# Patient Record
Sex: FEMALE | Race: WHITE | NOT HISPANIC OR LATINO | Employment: OTHER | URBAN - METROPOLITAN AREA
[De-identification: names, ages, dates, MRNs, and addresses within clinical notes are randomized per-mention and may not be internally consistent; named-entity substitution may affect disease eponyms.]

---

## 2022-08-07 ENCOUNTER — HOSPITAL ENCOUNTER (INPATIENT)
Facility: HOSPITAL | Age: 77
LOS: 3 days | Discharge: NURSING, REHAB OR LONG-TERM CARE | End: 2022-08-12
Attending: EMERGENCY MEDICINE | Admitting: INTERNAL MEDICINE

## 2022-08-07 ENCOUNTER — APPOINTMENT (OUTPATIENT)
Dept: GENERAL RADIOLOGY | Facility: HOSPITAL | Age: 77
End: 2022-08-07

## 2022-08-07 DIAGNOSIS — S42.402A CLOSED FRACTURE OF LEFT ELBOW, INITIAL ENCOUNTER: Primary | ICD-10-CM

## 2022-08-07 DIAGNOSIS — S82.035A CLOSED NONDISPLACED TRANSVERSE FRACTURE OF LEFT PATELLA, INITIAL ENCOUNTER: ICD-10-CM

## 2022-08-07 PROBLEM — S82.002A CLOSED FRACTURE OF LEFT PATELLA: Status: ACTIVE | Noted: 2022-08-07

## 2022-08-07 PROBLEM — E11.9 TYPE 2 DIABETES MELLITUS: Status: ACTIVE | Noted: 2022-08-07

## 2022-08-07 PROBLEM — S42.412A LEFT SUPRACONDYLAR HUMERUS FRACTURE: Status: ACTIVE | Noted: 2022-08-07

## 2022-08-07 PROBLEM — I10 ESSENTIAL HYPERTENSION: Status: ACTIVE | Noted: 2022-08-07
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
## 2022-08-07 PROBLEM — E03.9 HYPOTHYROIDISM: Status: ACTIVE | Noted: 2022-08-07

## 2022-08-07 LAB
ALBUMIN SERPL-MCNC: 4 G/DL (ref 3.5–5.2)
ALBUMIN/GLOB SERPL: 1.5 G/DL
ALP SERPL-CCNC: 91 U/L (ref 39–117)
ALT SERPL W P-5'-P-CCNC: 15 U/L (ref 1–33)
ANION GAP SERPL CALCULATED.3IONS-SCNC: 9 MMOL/L (ref 5–15)
AST SERPL-CCNC: 17 U/L (ref 1–32)
BASOPHILS # BLD AUTO: 0.04 10*3/MM3 (ref 0–0.2)
BASOPHILS NFR BLD AUTO: 0.4 % (ref 0–1.5)
BILIRUB SERPL-MCNC: 0.3 MG/DL (ref 0–1.2)
BUN SERPL-MCNC: 23 MG/DL (ref 8–23)
BUN/CREAT SERPL: 32.9 (ref 7–25)
CALCIUM SPEC-SCNC: 9 MG/DL (ref 8.6–10.5)
CHLORIDE SERPL-SCNC: 103 MMOL/L (ref 98–107)
CO2 SERPL-SCNC: 23 MMOL/L (ref 22–29)
CREAT SERPL-MCNC: 0.7 MG/DL (ref 0.57–1)
DEPRECATED RDW RBC AUTO: 41.3 FL (ref 37–54)
EGFRCR SERPLBLD CKD-EPI 2021: 90.3 ML/MIN/1.73
EOSINOPHIL # BLD AUTO: 0.22 10*3/MM3 (ref 0–0.4)
EOSINOPHIL NFR BLD AUTO: 2.1 % (ref 0.3–6.2)
ERYTHROCYTE [DISTWIDTH] IN BLOOD BY AUTOMATED COUNT: 13.2 % (ref 12.3–15.4)
FLUAV SUBTYP SPEC NAA+PROBE: NOT DETECTED
FLUBV RNA ISLT QL NAA+PROBE: NOT DETECTED
GLOBULIN UR ELPH-MCNC: 2.7 GM/DL
GLUCOSE BLDC GLUCOMTR-MCNC: 250 MG/DL (ref 70–130)
GLUCOSE SERPL-MCNC: 294 MG/DL (ref 65–99)
HBA1C MFR BLD: 13.3 % (ref 4.8–5.6)
HCT VFR BLD AUTO: 34.9 % (ref 34–46.6)
HGB BLD-MCNC: 12.1 G/DL (ref 12–15.9)
IMM GRANULOCYTES # BLD AUTO: 0.04 10*3/MM3 (ref 0–0.05)
IMM GRANULOCYTES NFR BLD AUTO: 0.4 % (ref 0–0.5)
LYMPHOCYTES # BLD AUTO: 1.78 10*3/MM3 (ref 0.7–3.1)
LYMPHOCYTES NFR BLD AUTO: 16.7 % (ref 19.6–45.3)
MCH RBC QN AUTO: 29.7 PG (ref 26.6–33)
MCHC RBC AUTO-ENTMCNC: 34.7 G/DL (ref 31.5–35.7)
MCV RBC AUTO: 85.5 FL (ref 79–97)
MONOCYTES # BLD AUTO: 0.63 10*3/MM3 (ref 0.1–0.9)
MONOCYTES NFR BLD AUTO: 5.9 % (ref 5–12)
NEUTROPHILS NFR BLD AUTO: 7.95 10*3/MM3 (ref 1.7–7)
NEUTROPHILS NFR BLD AUTO: 74.5 % (ref 42.7–76)
NRBC BLD AUTO-RTO: 0 /100 WBC (ref 0–0.2)
PLATELET # BLD AUTO: 174 10*3/MM3 (ref 140–450)
PMV BLD AUTO: 10.2 FL (ref 6–12)
POTASSIUM SERPL-SCNC: 4.1 MMOL/L (ref 3.5–5.2)
PROT SERPL-MCNC: 6.7 G/DL (ref 6–8.5)
RBC # BLD AUTO: 4.08 10*6/MM3 (ref 3.77–5.28)
SARS-COV-2 RNA PNL SPEC NAA+PROBE: DETECTED
SODIUM SERPL-SCNC: 135 MMOL/L (ref 136–145)
TSH SERPL DL<=0.05 MIU/L-ACNC: 13.2 UIU/ML (ref 0.27–4.2)
WBC NRBC COR # BLD: 10.66 10*3/MM3 (ref 3.4–10.8)

## 2022-08-07 PROCEDURE — 25010000002 HYDROMORPHONE PER 4 MG: Performed by: EMERGENCY MEDICINE

## 2022-08-07 PROCEDURE — 87636 SARSCOV2 & INF A&B AMP PRB: CPT | Performed by: INTERNAL MEDICINE

## 2022-08-07 PROCEDURE — 25010000002 FENTANYL CITRATE (PF) 50 MCG/ML SOLUTION: Performed by: EMERGENCY MEDICINE

## 2022-08-07 PROCEDURE — 63710000001 INSULIN LISPRO (HUMAN) PER 5 UNITS: Performed by: INTERNAL MEDICINE

## 2022-08-07 PROCEDURE — 2W39X1Z IMMOBILIZATION OF LEFT UPPER EXTREMITY USING SPLINT: ICD-10-PCS | Performed by: EMERGENCY MEDICINE

## 2022-08-07 PROCEDURE — 83036 HEMOGLOBIN GLYCOSYLATED A1C: CPT | Performed by: INTERNAL MEDICINE

## 2022-08-07 PROCEDURE — G0378 HOSPITAL OBSERVATION PER HR: HCPCS

## 2022-08-07 PROCEDURE — 82962 GLUCOSE BLOOD TEST: CPT

## 2022-08-07 PROCEDURE — 85025 COMPLETE CBC W/AUTO DIFF WBC: CPT | Performed by: NURSE PRACTITIONER

## 2022-08-07 PROCEDURE — 73560 X-RAY EXAM OF KNEE 1 OR 2: CPT

## 2022-08-07 PROCEDURE — 84443 ASSAY THYROID STIM HORMONE: CPT | Performed by: INTERNAL MEDICINE

## 2022-08-07 PROCEDURE — 80053 COMPREHEN METABOLIC PANEL: CPT | Performed by: NURSE PRACTITIONER

## 2022-08-07 PROCEDURE — 99284 EMERGENCY DEPT VISIT MOD MDM: CPT

## 2022-08-07 PROCEDURE — 99223 1ST HOSP IP/OBS HIGH 75: CPT | Performed by: INTERNAL MEDICINE

## 2022-08-07 PROCEDURE — 36415 COLL VENOUS BLD VENIPUNCTURE: CPT

## 2022-08-07 PROCEDURE — 25010000002 ONDANSETRON PER 1 MG: Performed by: EMERGENCY MEDICINE

## 2022-08-07 PROCEDURE — 73070 X-RAY EXAM OF ELBOW: CPT

## 2022-08-07 RX ORDER — NICOTINE POLACRILEX 4 MG
15 LOZENGE BUCCAL
Status: DISCONTINUED | OUTPATIENT
Start: 2022-08-07 | End: 2022-08-12 | Stop reason: HOSPADM

## 2022-08-07 RX ORDER — FENTANYL CITRATE 50 UG/ML
25 INJECTION, SOLUTION INTRAMUSCULAR; INTRAVENOUS ONCE
Status: COMPLETED | OUTPATIENT
Start: 2022-08-07 | End: 2022-08-07

## 2022-08-07 RX ORDER — BISACODYL 5 MG/1
5 TABLET, DELAYED RELEASE ORAL DAILY PRN
Status: DISCONTINUED | OUTPATIENT
Start: 2022-08-07 | End: 2022-08-12 | Stop reason: HOSPADM

## 2022-08-07 RX ORDER — ONDANSETRON 2 MG/ML
4 INJECTION INTRAMUSCULAR; INTRAVENOUS ONCE
Status: COMPLETED | OUTPATIENT
Start: 2022-08-07 | End: 2022-08-07

## 2022-08-07 RX ORDER — AMOXICILLIN 250 MG
2 CAPSULE ORAL 2 TIMES DAILY
Status: DISCONTINUED | OUTPATIENT
Start: 2022-08-07 | End: 2022-08-12 | Stop reason: HOSPADM

## 2022-08-07 RX ORDER — POLYETHYLENE GLYCOL 3350 17 G/17G
17 POWDER, FOR SOLUTION ORAL DAILY PRN
Status: DISCONTINUED | OUTPATIENT
Start: 2022-08-07 | End: 2022-08-12 | Stop reason: HOSPADM

## 2022-08-07 RX ORDER — ONDANSETRON 2 MG/ML
4 INJECTION INTRAMUSCULAR; INTRAVENOUS EVERY 6 HOURS PRN
Status: DISCONTINUED | OUTPATIENT
Start: 2022-08-07 | End: 2022-08-12 | Stop reason: HOSPADM

## 2022-08-07 RX ORDER — SODIUM CHLORIDE 0.9 % (FLUSH) 0.9 %
10 SYRINGE (ML) INJECTION AS NEEDED
Status: DISCONTINUED | OUTPATIENT
Start: 2022-08-07 | End: 2022-08-12 | Stop reason: HOSPADM

## 2022-08-07 RX ORDER — HYDROCODONE BITARTRATE AND ACETAMINOPHEN 5; 325 MG/1; MG/1
1 TABLET ORAL EVERY 4 HOURS PRN
Status: DISCONTINUED | OUTPATIENT
Start: 2022-08-07 | End: 2022-08-12 | Stop reason: HOSPADM

## 2022-08-07 RX ORDER — BISACODYL 10 MG
10 SUPPOSITORY, RECTAL RECTAL DAILY PRN
Status: DISCONTINUED | OUTPATIENT
Start: 2022-08-07 | End: 2022-08-12 | Stop reason: HOSPADM

## 2022-08-07 RX ORDER — HYDROMORPHONE HYDROCHLORIDE 1 MG/ML
0.5 INJECTION, SOLUTION INTRAMUSCULAR; INTRAVENOUS; SUBCUTANEOUS ONCE
Status: COMPLETED | OUTPATIENT
Start: 2022-08-07 | End: 2022-08-07

## 2022-08-07 RX ORDER — SODIUM CHLORIDE 0.9 % (FLUSH) 0.9 %
10 SYRINGE (ML) INJECTION EVERY 12 HOURS SCHEDULED
Status: DISCONTINUED | OUTPATIENT
Start: 2022-08-07 | End: 2022-08-12 | Stop reason: HOSPADM

## 2022-08-07 RX ORDER — INSULIN LISPRO 100 [IU]/ML
0-7 INJECTION, SOLUTION INTRAVENOUS; SUBCUTANEOUS
Status: DISCONTINUED | OUTPATIENT
Start: 2022-08-07 | End: 2022-08-09

## 2022-08-07 RX ORDER — DEXTROSE MONOHYDRATE 25 G/50ML
25 INJECTION, SOLUTION INTRAVENOUS
Status: DISCONTINUED | OUTPATIENT
Start: 2022-08-07 | End: 2022-08-12 | Stop reason: HOSPADM

## 2022-08-07 RX ADMIN — HYDROCODONE BITARTRATE AND ACETAMINOPHEN 1 TABLET: 5; 325 TABLET ORAL at 13:36

## 2022-08-07 RX ADMIN — INSULIN LISPRO 4 UNITS: 100 INJECTION, SOLUTION INTRAVENOUS; SUBCUTANEOUS at 17:32

## 2022-08-07 RX ADMIN — HYDROCODONE BITARTRATE AND ACETAMINOPHEN 1 TABLET: 5; 325 TABLET ORAL at 17:32

## 2022-08-07 RX ADMIN — HYDROMORPHONE HYDROCHLORIDE 0.5 MG: 1 INJECTION, SOLUTION INTRAMUSCULAR; INTRAVENOUS; SUBCUTANEOUS at 09:00

## 2022-08-07 RX ADMIN — ONDANSETRON 4 MG: 2 INJECTION INTRAMUSCULAR; INTRAVENOUS at 09:00

## 2022-08-07 RX ADMIN — FENTANYL CITRATE 25 MCG: 50 INJECTION, SOLUTION INTRAMUSCULAR; INTRAVENOUS at 07:42

## 2022-08-07 RX ADMIN — SODIUM CHLORIDE 500 ML: 9 INJECTION, SOLUTION INTRAVENOUS at 07:42

## 2022-08-07 RX ADMIN — SODIUM CHLORIDE 500 ML: 9 INJECTION, SOLUTION INTRAVENOUS at 08:15

## 2022-08-07 RX ADMIN — Medication 10 ML: at 20:41

## 2022-08-07 NOTE — CONSULTS
Orthopedic Consult      Patient: Shira Rogers    Date of Admission: 8/7/2022  7:13 AM    YOB: 1945    Medical Record Number: [id number]    Attending Physician: Loan Sanz MD    Consulting Physician: Edilson Allison Jr., MD      Chief Complaints: Fall [W19.XXXA]      History of Present Illness: 77 y.o. female admitted to [facility] with Fall [W19.XXXA]. I was consulted for further evaluation and treatment of left elbow and knee pain. Onset of symptoms was abrupt starting a few hours ago.  Symptoms are associated with left elbow and knee pain.  Symptoms are aggravated by motion and weightbearing to left upper extremity left lower extremity.   Symptoms improve with rest and elevation.  Is a 77-year-old female who presents after mechanical fall onto left side with resultant left elbow and knee pain.  She denies any pain elsewhere.  She denies any motor deficits or paresthesias.     No Known Allergies     Home Medications:  (Not in a hospital admission)        No past medical history on file.   No past surgical history on file.     Social History     Occupational History   • Not on file   Tobacco Use   • Smoking status: Not on file   • Smokeless tobacco: Not on file   Substance and Sexual Activity   • Alcohol use: Not on file   • Drug use: Not on file   • Sexual activity: Not on file      Social History     Social History Narrative   • Not on file      No family history on file.      Review of Systems:   HEENT: Patient denies any headaches, vision changes, change in hearing, or tinnitus, Patient denies any rhinorrhea, epistaxis, sinus pain, mouth or dental problems, sore throat or hoarseness, or dysphagia  Pulmonary: Patient denies any cough, congestion, SOA, or wheezing  Cardiovascular: Patient denies any chest pain, dyspnea, palpitations, weakness, intolerance of exercise, varicosities, swelling of extremities, known murmur  Gastrointestinal:  Patient denies nausea, vomiting, diarrhea,  constipation, loss  of appetite, change in appetite, dysphagia, gas, heartburn, melena, change in bowel habits, use of laxatives or other drugs to alter the function of the gastrointestinal tract.  Genital/Urinary: Patient denies dysuria, change in color of urine, change in frequency of urination, pain with urgency, incontinence, retention, or nocturia.  Musculoskeletal: Patient denies increased warmth; redness; or swelling of joints; limitation of function; deformity; crepitation: pain in a joint or an extremity, the neck, or the back, especially with movement.  Neurological: Patient denies dizziness, tremor, ataxia, difficulty in speaking, change in speech, paresthesia, loss of sensation, seizures, syncope, changes in memory.  Endocrine system: Patient denies tremors, palpitations, intolerance of heat or cold, polyuria, polydipsia, polyphagia, diaphoresis, exophthalmos, or goiter.  Psychological: Patient denies thoughts/plans or harming self or other; depression,  insomnia, night terrors, clovis, memory loss, disorientation.  Skin: Patient denies any bruising, rashes, discoloration, pruritus, wounds, ulcers, decubiti, changes in the hair or nails  Hematopoietic: Patient denies history of spontaneous or excessive bleeding, epistaxis, hematuria, melena, fatigue, enlarged or tender lymph nodes, pallor, history of anemia.    Physical Exam: 77 y.o. female  General Appearance:    Alert, cooperative, in no acute distress                   Vitals:    08/07/22 0756 08/07/22 0819 08/07/22 0820 08/07/22 0830   BP:    164/81   Pulse: 74 71 76 87   Resp:       Temp:       SpO2: 99% 98% 93% 100%   Weight:       Height:            Head:    Normocephalic, without obvious abnormality, atraumatic      Right Upper Extremity:  No obvious deformity, painless ROM shoulder, elbow, wrist, no joint instability, normal distal strength and sensation to light touch, skin intact without cyanosis, clubbing, edema; +2 radial pulse  Left Upper  Extremity: Positive pain and swelling to the left elbow, no joint instability, normal distal strength and sensation to light touch, skin intact without cyanosis, clubbing, edema; +2 radial pulse  Right Lower Extremity:  No obvious deformity, painless ROM hip, knee, ankle, compartments soft, normal distal strength and sensation to light touch, skin intact without cyanosis, clubbing, edema; +2 dorsalis pedis pulse  Left Lower Extremity: Large suprapatellar effusion with pain about the anterior knee.  Extensor mechanism intact. compartments soft, normal distal strength and sensation to light touch, skin intact without cyanosis, clubbing, edema; +2 dorsalis pedis pulse         Diagnostic Tests:    I have reviewed the labs, radiology results and diagnostic studies: AP and lateral left elbow and AP and lateral of left knee reports were reviewed.    AP and lateral of left elbow demonstrates minimally angulated supracondylar distal humerus fracture without any evidence of intra-articular extension.  There is moderate arthrosis of the elbow joint    AP and lateral of left knee on my exam demonstrates nondisplaced fracture of the distal pole of the patella with large suprapatellar effusion and underlying tricompartmental arthrosis.    Results from last 7 days   Lab Units 08/07/22  0827   WBC 10*3/mm3 10.66   HEMOGLOBIN g/dL 12.1   PLATELETS 10*3/mm3 174     Results from last 7 days   Lab Units 08/07/22  0827   SODIUM mmol/L 135*   POTASSIUM mmol/L 4.1   CO2 mmol/L 23.0   CREATININE mg/dL 0.70   GLUCOSE mg/dL 294*           Assessment: Left distal humerus fracture  Left patella fracture  Patient Active Problem List   Diagnosis   • Fall   • Type 2 diabetes mellitus (HCC)   • Hypothyroidism   • Essential hypertension   • Left supracondylar humerus fracture   • Closed fracture of left patella           Plan: Given the lack of displacement of either fracture, we will first give patient a trial of nonoperative management of both  injuries.  Should patient have difficulty with either of these injuries, we may need to consider operative intervention given the fact that she is a polytrauma.  She will be admitted for pain control.  Upon admission, physical therapy and Occupational Therapy can evaluate her and we will assess the need for operative intervention accordingly.    Nonweightbearing left upper extremity.  Please keep splint clean and dry at all times. Patient may use platform walker.    Weightbearing as tolerated left lower extremity with knee immobilizer on at all times.      We will continue to follow.            Edilson Allison Jr., MD  08/07/22  09:29 EDT

## 2022-08-07 NOTE — ED PROVIDER NOTES
EMERGENCY DEPARTMENT ENCOUNTER    Pt Name: Shira Rogers  MRN: [id number]  Pt :   1945  Room Number:  S380/1  Date of encounter:  2022  PCP: Miguel Mazariegos MD  ED Provider: MARTA Lou    Historian: patient      HPI:  Chief Complaint: Fall with left elbow and left knee injury        Context: Shira Rogers is a 77 y.o. female who presents to the ED via EMS who was called to her home by family.  Patient sustained a fall on her back porch on stairs approximately 1 AM.  She managed to get into her home and sit quietly without seeking assistance from sleeping family.  They found her in the morning with acute pain to her left elbow and left knee and inability to weight-bear.  She denies any head, neck, spine injury.  She denies any dizziness or syncope.    Review of system is negative for fever chills or recent illness.  Negative for chest pain or cough or shortness of breath.  GI and  systems are negative.  No profound weakness, dizziness or syncope.  No neurosensory complaint or focal weakness.      PAST MEDICAL HISTORY  No past medical history on file.      PAST SURGICAL HISTORY  No past surgical history on file.      FAMILY HISTORY  No family history on file.      SOCIAL HISTORY  Social History     Socioeconomic History   • Marital status:          ALLERGIES  Patient has no known allergies.        REVIEW OF SYSTEMS  Review of Systems   All systems reviewed and negative except for those discussed in HPI.       PHYSICAL EXAM    I have reviewed the triage vital signs and nursing notes.    ED Triage Vitals [22 0714]   Temp Heart Rate Resp BP SpO2   98.6 °F (37 °C) 79 18 155/90 96 %      Temp src Heart Rate Source Patient Position BP Location FiO2 (%)   -- -- -- -- --       Physical Exam  GENERAL:   Appears in no acute distress.  She is clearly uncomfortable.  She is a very good historian.  Her vital signs are normal  HENT: Nares patent.  Atraumatic.  Normocephalic  Neck: Patient  demonstrates full range of motion without limitation or tenderness to palpation of cervical spine  EYES: No scleral icterus.  CV: Regular rhythm, regular rate.  No tachycardia.  No peripheral edema  RESPIRATORY: Normal effort.  No audible wheezes, rales or rhonchi.  Chest wall is nontender  ABDOMEN: Soft, nontender  MUSCULOSKELETAL: No deformities.  Pelvis is stable.  Left upper extremity: Patient has soft tissue swelling and acute pain at the left elbow with limited range of motion.  Proximal and distal joints are negative.  Neurovascular exam is negative.  Left lower extremity: Patient has a grossly enlarged left knee consistent with a severe traumatic effusion.  Proximal and distal joints are negative.  Neurovascular exam is negative.  Right-sided extremities are negative.  NEURO: Alert, moves all extremities, follows commands.  No neurosensory deficit or focal weakness.  SKIN: Warm, dry, no rash visualized.        LAB RESULTS  Recent Results (from the past 24 hour(s))   Comprehensive Metabolic Panel    Collection Time: 08/07/22  8:27 AM    Specimen: Blood   Result Value Ref Range    Glucose 294 (H) 65 - 99 mg/dL    BUN 23 8 - 23 mg/dL    Creatinine 0.70 0.57 - 1.00 mg/dL    Sodium 135 (L) 136 - 145 mmol/L    Potassium 4.1 3.5 - 5.2 mmol/L    Chloride 103 98 - 107 mmol/L    CO2 23.0 22.0 - 29.0 mmol/L    Calcium 9.0 8.6 - 10.5 mg/dL    Total Protein 6.7 6.0 - 8.5 g/dL    Albumin 4.00 3.50 - 5.20 g/dL    ALT (SGPT) 15 1 - 33 U/L    AST (SGOT) 17 1 - 32 U/L    Alkaline Phosphatase 91 39 - 117 U/L    Total Bilirubin 0.3 0.0 - 1.2 mg/dL    Globulin 2.7 gm/dL    A/G Ratio 1.5 g/dL    BUN/Creatinine Ratio 32.9 (H) 7.0 - 25.0    Anion Gap 9.0 5.0 - 15.0 mmol/L    eGFR 90.3 >60.0 mL/min/1.73   CBC Auto Differential    Collection Time: 08/07/22  8:27 AM    Specimen: Blood   Result Value Ref Range    WBC 10.66 3.40 - 10.80 10*3/mm3    RBC 4.08 3.77 - 5.28 10*6/mm3    Hemoglobin 12.1 12.0 - 15.9 g/dL    Hematocrit 34.9 34.0  - 46.6 %    MCV 85.5 79.0 - 97.0 fL    MCH 29.7 26.6 - 33.0 pg    MCHC 34.7 31.5 - 35.7 g/dL    RDW 13.2 12.3 - 15.4 %    RDW-SD 41.3 37.0 - 54.0 fl    MPV 10.2 6.0 - 12.0 fL    Platelets 174 140 - 450 10*3/mm3    Neutrophil % 74.5 42.7 - 76.0 %    Lymphocyte % 16.7 (L) 19.6 - 45.3 %    Monocyte % 5.9 5.0 - 12.0 %    Eosinophil % 2.1 0.3 - 6.2 %    Basophil % 0.4 0.0 - 1.5 %    Immature Grans % 0.4 0.0 - 0.5 %    Neutrophils, Absolute 7.95 (H) 1.70 - 7.00 10*3/mm3    Lymphocytes, Absolute 1.78 0.70 - 3.10 10*3/mm3    Monocytes, Absolute 0.63 0.10 - 0.90 10*3/mm3    Eosinophils, Absolute 0.22 0.00 - 0.40 10*3/mm3    Basophils, Absolute 0.04 0.00 - 0.20 10*3/mm3    Immature Grans, Absolute 0.04 0.00 - 0.05 10*3/mm3    nRBC 0.0 0.0 - 0.2 /100 WBC   Hemoglobin A1c    Collection Time: 08/07/22  8:27 AM    Specimen: Blood   Result Value Ref Range    Hemoglobin A1C 13.30 (H) 4.80 - 5.60 %   TSH    Collection Time: 08/07/22  8:27 AM    Specimen: Blood   Result Value Ref Range    TSH 13.200 (H) 0.270 - 4.200 uIU/mL   COVID-19 and FLU A/B PCR - Swab, Nasopharynx    Collection Time: 08/07/22 11:46 AM    Specimen: Nasopharynx; Swab   Result Value Ref Range    COVID19 Detected (C) Not Detected - Ref. Range    Influenza A PCR Not Detected Not Detected    Influenza B PCR Not Detected Not Detected   POC Glucose Once    Collection Time: 08/07/22  4:54 PM    Specimen: Blood   Result Value Ref Range    Glucose 250 (H) 70 - 130 mg/dL       If labs were ordered, I independently reviewed the results.        RADIOLOGY  XR ELBOW 2 VIEW LEFT    Result Date: 8/7/2022  DATE OF EXAM: 8/7/2022 7:39 AM  PROCEDURE: XR ELBOW 2 VW LEFT-  INDICATIONS: fall  COMPARISON: No comparisons available.  TECHNIQUE: Two Radiologic views of the left elbow were obtained.  FINDINGS: There is an acute supracondylar fracture of the distal humerus which on AP view appears to span transversely just proximal from the medial to the lateral epicondyles. Moderate  effusion is present.      There is an acute supracondylar fracture of the distal humerus which on AP view appears to span transversely just proximal from the medial to the lateral epicondyles. Moderate effusion is present.  This report was finalized on 8/7/2022 8:06 AM by Isac Avelar.      XR Knee 1 or 2 View Left    Result Date: 8/7/2022  DATE OF EXAM: 8/7/2022 7:39 AM  PROCEDURE: XR KNEE 1 OR 2 VW LEFT-  INDICATIONS: left knee  COMPARISON: No comparisons available.  TECHNIQUE: One to two radiologic views of left knee were obtained.  FINDINGS: There is cortical disruption along the superior pole of the patella concerning for acute minimally displaced fracture. Cortical margins are otherwise intact and alignment appears grossly maintained. 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 There is a moderate to large dense suprapatellar effusion, likely some component of hemarthrosis.      There is cortical disruption along the superior pole of the patella concerning for acute minimally displaced fracture. Cortical margins are otherwise intact and alignment appears grossly maintained. 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 There is a moderate to large dense suprapatellar effusion, likely some component of hemarthrosis.  This report was finalized on 8/7/2022 8:02 AM by Isac Avelar.        PROCEDURES    Splint - Cast - Strapping    Date/Time: 8/7/2022 6:32 PM  Performed by: Amanda St APRN  Authorized by: Thang Cotto MD     Consent:     Consent obtained:  Verbal    Consent given by:  Patient    Risks, benefits, and alternatives were discussed: yes      Risks discussed:  Pain  Universal protocol:     Procedure explained and questions answered to patient or proxy's satisfaction: yes      Relevant documents present and verified: yes      Test results available: yes      Imaging studies available: yes      Patient identity confirmed:  Verbally with  patient  Pre-procedure details:     Distal neurologic exam:  Normal    Distal perfusion: distal pulses strong and brisk capillary refill    Procedure details:     Location:  Elbow    Elbow location:  L elbow    Strapping: no      Cast type:  Short arm    Splint type:  Sugar tong    Supplies:  Cotton padding, fiberglass and elastic bandage    Attestation: Splint applied and adjusted personally by me    Post-procedure details:     Distal neurologic exam:  Normal    Distal perfusion: distal pulses strong and brisk capillary refill      Procedure completion:  Tolerated well, no immediate complications    Post-procedure imaging: not applicable          No orders to display       MEDICATIONS GIVEN IN ER    Medications   sodium chloride 0.9 % flush 10 mL (has no administration in time range)   sodium chloride 0.9 % flush 10 mL ( Intravenous Canceled Entry 8/7/22 1257)   sodium chloride 0.9 % flush 10 mL (has no administration in time range)   HYDROcodone-acetaminophen (NORCO) 5-325 MG per tablet 1 tablet (1 tablet Oral Given 8/7/22 1732)   sennosides-docusate (PERICOLACE) 8.6-50 MG per tablet 2 tablet (has no administration in time range)     And   polyethylene glycol (MIRALAX) packet 17 g (has no administration in time range)     And   bisacodyl (DULCOLAX) EC tablet 5 mg (has no administration in time range)     And   bisacodyl (DULCOLAX) suppository 10 mg (has no administration in time range)   ondansetron (ZOFRAN) injection 4 mg (has no administration in time range)   dextrose (GLUTOSE) oral gel 15 g (has no administration in time range)   dextrose (D50W) (25 g/50 mL) IV injection 25 g (has no administration in time range)   glucagon (human recombinant) (GLUCAGEN DIAGNOSTIC) injection 1 mg (has no administration in time range)   Insulin Lispro (humaLOG) injection 0-7 Units (4 Units Subcutaneous Given 8/7/22 1732)   fentaNYL citrate (PF) (SUBLIMAZE) injection 25 mcg (25 mcg Intravenous Given 8/7/22 7842)   sodium  chloride 0.9 % bolus 500 mL (0 mL Intravenous Stopped 8/7/22 0824)   sodium chloride 0.9 % bolus 500 mL (0 mL Intravenous Stopped 8/7/22 0900)   HYDROmorphone (DILAUDID) injection 0.5 mg (0.5 mg Intravenous Given 8/7/22 0900)   ondansetron (ZOFRAN) injection 4 mg (4 mg Intravenous Given 8/7/22 0900)         ED Course as of 08/07/22 1833   Sun Aug 07, 2022   0944 Dr. Allison has been consulted; Dr. Briones has accepted for inpatient medical service.  Dr. Allison has visited the patient in the ed.  Skin tear to the left forearm is cleansed and dressed; Left knee immobilizer being placed.   [MS]      ED Course User Index  [MS] Amanda St APRN           AS OF 18:33 EDT VITALS:    BP - 131/71  HR - 68  TEMP - 98.6 °F (37 °C)  O2 SATS - 96%                  DIAGNOSIS  Final diagnoses:   Closed fracture of left elbow, initial encounter   Closed nondisplaced transverse fracture of left patella, initial encounter         DISPOSITION    Admitted             Amanda St APRN  08/07/22 1833

## 2022-08-08 LAB
ANION GAP SERPL CALCULATED.3IONS-SCNC: 8 MMOL/L (ref 5–15)
BUN SERPL-MCNC: 14 MG/DL (ref 8–23)
BUN/CREAT SERPL: 18.4 (ref 7–25)
CALCIUM SPEC-SCNC: 8.5 MG/DL (ref 8.6–10.5)
CHLORIDE SERPL-SCNC: 102 MMOL/L (ref 98–107)
CO2 SERPL-SCNC: 24 MMOL/L (ref 22–29)
CREAT SERPL-MCNC: 0.76 MG/DL (ref 0.57–1)
DEPRECATED RDW RBC AUTO: 41.7 FL (ref 37–54)
EGFRCR SERPLBLD CKD-EPI 2021: 81.8 ML/MIN/1.73
ERYTHROCYTE [DISTWIDTH] IN BLOOD BY AUTOMATED COUNT: 13.3 % (ref 12.3–15.4)
GLUCOSE BLDC GLUCOMTR-MCNC: 242 MG/DL (ref 70–130)
GLUCOSE BLDC GLUCOMTR-MCNC: 270 MG/DL (ref 70–130)
GLUCOSE BLDC GLUCOMTR-MCNC: 274 MG/DL (ref 70–130)
GLUCOSE SERPL-MCNC: 286 MG/DL (ref 65–99)
HCT VFR BLD AUTO: 33.7 % (ref 34–46.6)
HGB BLD-MCNC: 11.5 G/DL (ref 12–15.9)
MCH RBC QN AUTO: 29.1 PG (ref 26.6–33)
MCHC RBC AUTO-ENTMCNC: 34.1 G/DL (ref 31.5–35.7)
MCV RBC AUTO: 85.3 FL (ref 79–97)
PLATELET # BLD AUTO: 159 10*3/MM3 (ref 140–450)
PMV BLD AUTO: 10.1 FL (ref 6–12)
POTASSIUM SERPL-SCNC: 4.3 MMOL/L (ref 3.5–5.2)
RBC # BLD AUTO: 3.95 10*6/MM3 (ref 3.77–5.28)
SODIUM SERPL-SCNC: 134 MMOL/L (ref 136–145)
T4 FREE SERPL-MCNC: 0.9 NG/DL (ref 0.93–1.7)
WBC NRBC COR # BLD: 7.78 10*3/MM3 (ref 3.4–10.8)

## 2022-08-08 PROCEDURE — G0378 HOSPITAL OBSERVATION PER HR: HCPCS

## 2022-08-08 PROCEDURE — 63710000001 INSULIN LISPRO (HUMAN) PER 5 UNITS: Performed by: INTERNAL MEDICINE

## 2022-08-08 PROCEDURE — 84439 ASSAY OF FREE THYROXINE: CPT | Performed by: INTERNAL MEDICINE

## 2022-08-08 PROCEDURE — 97161 PT EVAL LOW COMPLEX 20 MIN: CPT

## 2022-08-08 PROCEDURE — 99232 SBSQ HOSP IP/OBS MODERATE 35: CPT | Performed by: INTERNAL MEDICINE

## 2022-08-08 PROCEDURE — 82962 GLUCOSE BLOOD TEST: CPT

## 2022-08-08 PROCEDURE — 97165 OT EVAL LOW COMPLEX 30 MIN: CPT | Performed by: OCCUPATIONAL THERAPIST

## 2022-08-08 PROCEDURE — 80048 BASIC METABOLIC PNL TOTAL CA: CPT | Performed by: INTERNAL MEDICINE

## 2022-08-08 PROCEDURE — 97535 SELF CARE MNGMENT TRAINING: CPT | Performed by: OCCUPATIONAL THERAPIST

## 2022-08-08 PROCEDURE — 63710000001 INSULIN DETEMIR PER 5 UNITS: Performed by: INTERNAL MEDICINE

## 2022-08-08 PROCEDURE — 85027 COMPLETE CBC AUTOMATED: CPT | Performed by: INTERNAL MEDICINE

## 2022-08-08 PROCEDURE — 97530 THERAPEUTIC ACTIVITIES: CPT

## 2022-08-08 RX ORDER — MECLIZINE HYDROCHLORIDE 25 MG/1
25 TABLET ORAL 3 TIMES DAILY PRN
Status: ON HOLD | COMMUNITY
End: 2022-08-12 | Stop reason: SDUPTHER

## 2022-08-08 RX ORDER — AZELASTINE 1 MG/ML
137 SPRAY, METERED NASAL 2 TIMES DAILY
Status: ON HOLD | COMMUNITY
End: 2022-08-12 | Stop reason: SDUPTHER

## 2022-08-08 RX ORDER — MECLIZINE HYDROCHLORIDE 25 MG/1
25 TABLET ORAL 3 TIMES DAILY PRN
Status: DISCONTINUED | OUTPATIENT
Start: 2022-08-08 | End: 2022-08-12 | Stop reason: HOSPADM

## 2022-08-08 RX ORDER — GLIMEPIRIDE 2 MG/1
2 TABLET ORAL DAILY
Status: ON HOLD | COMMUNITY
End: 2022-08-12 | Stop reason: SDUPTHER

## 2022-08-08 RX ORDER — ATORVASTATIN CALCIUM 40 MG/1
40 TABLET, FILM COATED ORAL DAILY
COMMUNITY

## 2022-08-08 RX ORDER — INSULIN GLARGINE 300 U/ML
300 INJECTION, SOLUTION SUBCUTANEOUS NIGHTLY
Status: ON HOLD | COMMUNITY
End: 2022-08-12 | Stop reason: SDUPTHER

## 2022-08-08 RX ORDER — ATORVASTATIN CALCIUM 40 MG/1
40 TABLET, FILM COATED ORAL NIGHTLY
Status: DISCONTINUED | OUTPATIENT
Start: 2022-08-08 | End: 2022-08-12 | Stop reason: HOSPADM

## 2022-08-08 RX ORDER — LEVOTHYROXINE SODIUM 0.1 MG/1
100 TABLET ORAL
Status: DISCONTINUED | OUTPATIENT
Start: 2022-08-09 | End: 2022-08-12 | Stop reason: HOSPADM

## 2022-08-08 RX ORDER — LEVOTHYROXINE SODIUM 88 UG/1
88 TABLET ORAL DAILY
Status: ON HOLD | COMMUNITY
End: 2022-08-12 | Stop reason: SDUPTHER

## 2022-08-08 RX ADMIN — Medication 10 ML: at 08:52

## 2022-08-08 RX ADMIN — INSULIN DETEMIR 10 UNITS: 100 INJECTION, SOLUTION SUBCUTANEOUS at 20:21

## 2022-08-08 RX ADMIN — HYDROCODONE BITARTRATE AND ACETAMINOPHEN 1 TABLET: 5; 325 TABLET ORAL at 04:30

## 2022-08-08 RX ADMIN — Medication 10 ML: at 20:21

## 2022-08-08 RX ADMIN — INSULIN LISPRO 4 UNITS: 100 INJECTION, SOLUTION INTRAVENOUS; SUBCUTANEOUS at 17:35

## 2022-08-08 RX ADMIN — SENNOSIDES AND DOCUSATE SODIUM 2 TABLET: 50; 8.6 TABLET ORAL at 20:20

## 2022-08-08 RX ADMIN — HYDROCODONE BITARTRATE AND ACETAMINOPHEN 1 TABLET: 5; 325 TABLET ORAL at 20:20

## 2022-08-08 RX ADMIN — SENNOSIDES AND DOCUSATE SODIUM 2 TABLET: 50; 8.6 TABLET ORAL at 08:51

## 2022-08-08 RX ADMIN — INSULIN LISPRO 3 UNITS: 100 INJECTION, SOLUTION INTRAVENOUS; SUBCUTANEOUS at 12:26

## 2022-08-08 RX ADMIN — INSULIN LISPRO 4 UNITS: 100 INJECTION, SOLUTION INTRAVENOUS; SUBCUTANEOUS at 08:51

## 2022-08-08 RX ADMIN — ATORVASTATIN CALCIUM 40 MG: 40 TABLET, FILM COATED ORAL at 20:20

## 2022-08-08 NOTE — PLAN OF CARE
Goal Outcome Evaluation:  Plan of Care Reviewed With: patient           Outcome Evaluation: Pt evaluation limited due to low BP and c/o severe dizziness. Pt presents with decreased balance, pain, and  functional strength/activity tolerance deficits limiting her mobility. Pt mod Ax2 for bed mobility, mod Ax2 for STS, and mod Ax2 for SPT from BSC to bed. Pt with complaints of dizziness w/ BP reading at 70/48 while in a seated position. Pt noted subsiding symptoms after being returned to bed in a supine position. Recommend attempting to ambulate w/ use of erinn-walker or cane when ready to progress mobility. IPPT warranted. Recommend dc SNF with potential to dc to IPR with improved tolerance for therapy.

## 2022-08-08 NOTE — THERAPY EVALUATION
Patient Name: Shira Rogers  : 1945    MRN: [id number]                              Today's Date: 2022       Admit Date: 2022    Visit Dx:     ICD-10-CM ICD-9-CM   1. Closed fracture of left elbow, initial encounter  S42.402A 812.40   2. Closed nondisplaced transverse fracture of left patella, initial encounter  S82.035A 822.0     Patient Active Problem List   Diagnosis   • Fall   • Type 2 diabetes mellitus (HCC)   • Hypothyroidism   • Essential hypertension   • Left supracondylar humerus fracture   • Closed fracture of left patella     Past Medical History:   Diagnosis Date   • Arthritis    • Diabetes mellitus (HCC)    • Disease of thyroid gland    • Elevated cholesterol      Past Surgical History:   Procedure Laterality Date   • APPENDECTOMY     • HYSTERECTOMY     • TONSILLECTOMY        General Information     Row Name 22 105          Physical Therapy Time and Intention    Document Type evaluation  -FW     Mode of Treatment physical therapy  -     Row Name 22 105          General Information    Patient Profile Reviewed yes  -FW     Prior Level of Function independent:;all household mobility;community mobility;gait;transfer;bed mobility;ADL's;home management;cooking;cleaning;driving;using stairs  has RW and shower bench but does not use  -FW     Existing Precautions/Restrictions brace on at all times;non-weight bearing;left;weight bearing;other (see comments)  LUE NWB- sling for comfort measures, may use platform walker; KI on LLE at all times- WBAT w/ KI  -FW     Barriers to Rehab none identified  -FW     Row Name 22 105          Living Environment    People in Home spouse  -FW     Row Name 22 105          Home Main Entrance    Number of Stairs, Main Entrance three  -FW     Stair Railings, Main Entrance none  -FW     Row Name 22 1057          Stairs Within Home, Primary    Stairs, Within Home, Primary 17  17 up to bedroom but has bedroom/bathroom on first level  w/o steps  -FW     Number of Stairs, Within Home, Primary other (see comments)  -FW     Stair Railings, Within Home, Primary railings safe and in good condition  -FW     Row Name 08/08/22 1057          Cognition    Orientation Status (Cognition) oriented x 4  -FW     Row Name 08/08/22 1057          Safety Issues, Functional Mobility    Safety Issues Affecting Function (Mobility) problem-solving;safety precaution awareness;sequencing abilities;insight into deficits/self-awareness;safety precautions follow-through/compliance  -FW     Impairments Affecting Function (Mobility) balance;endurance/activity tolerance;pain  -FW           User Key  (r) = Recorded By, (t) = Taken By, (c) = Cosigned By    Initials Name Provider Type    FW Naveen Silva PT Physical Therapist               Mobility     Row Name 08/08/22 1103          Bed Mobility    Bed Mobility scooting/bridging;sit-supine;supine-sit  -FW     Scooting/Bridging [place] (Bed Mobility) 2 person assist;moderate assist (50% patient effort)  -FW     Supine-Sit [place] (Bed Mobility) moderate assist (50% patient effort);2 person assist  -FW     Sit-Supine [place] (Bed Mobility) moderate assist (50% patient effort);2 person assist  -FW     Assistive Device (Bed Mobility) draw sheet;bed rails  -FW     Row Name 08/08/22 1103          Bed-Chair Transfer    Bed-Chair [place] (Transfers) moderate assist (50% patient effort);2 person assist  -FW     Assistive Device (Bed-Chair Transfers) other (see comments)  BENJIE HDZ  -FW     Comment, (Bed-Chair Transfer) able to stand and pivot over to chair slowly, demonstrated diffiulty with LLE management and decreased weight shift to affected side  -FW     Row Name 08/08/22 1103          Sit-Stand Transfer    Sit-Stand [place] (Transfers) moderate assist (50% patient effort);2 person assist  -FW     Row Name 08/08/22 1103          Gait/Stairs (Locomotion)    [place] Level (Gait) unable to assess  -FW      Comment, (Gait/Stairs) pt w/ c/o of severe dizziness w/ drop in BP while seated on BSC , not safe to progress mobility. recommend gait training with erinn-walker or cane  -FW     Row Name 08/08/22 1103          Mobility    Extremity Weight-bearing Status left upper extremity;left lower extremity  -FW     Left Upper Extremity (Weight-bearing Status) non weight-bearing (NWB)  sling for comfort measures  -FW     Left Lower Extremity (Weight-bearing Status) weight-bearing as tolerated (WBAT)  WBAT in KI  -FW           User Key  (r) = Recorded By, (t) = Taken By, (c) = Cosigned By    Initials Name Provider Type    FW Naveen Silva PT Physical Therapist               Obj/Interventions     Row Name 08/08/22 1108          Range of Motion Comprehensive    General Range of Motion other (see comments)  -FW     Comment, General Range of Motion RLE WFL, unable to assess LLE w/ KI  -FW     Row Name 08/08/22 1108          Strength Comprehensive (MMT)    Comment, General Manual Muscle Testing (MMT) Assessment RLE WFL, unable to perform SLR w/o assistance on L  -FW     Row Name 08/08/22 1108          Balance    Balance Assessment sitting static balance;sitting dynamic balance;standing static balance;standing dynamic balance  -FW     Static Sitting Balance standby assist  -FW     Dynamic Sitting Balance contact guard  -FW     Position, Sitting Balance sitting edge of bed  -FW     Static Standing Balance moderate assist  -FW     Dynamic Standing Balance moderate assist;2-person assist  -FW     Position/Device Used, Standing Balance other (see comments)  RUE HHA  -FW     Row Name 08/08/22 1108          Sensory Assessment (Somatosensory)    Sensory Assessment (Somatosensory) sensation intact  -FW           User Key  (r) = Recorded By, (t) = Taken By, (c) = Cosigned By    Initials Name Provider Type    FW Naveen Silva PT Physical Therapist               Goals/Plan     Row Name 08/08/22 1119          Bed Mobility Goal 1  (PT)    Activity/Assistive Device (Bed Mobility Goal 1, PT) sit to supine/supine to sit  -FW     [place] Level/Cues Needed (Bed Mobility Goal 1, PT) minimum assist (75% or more patient effort)  -FW     Time Frame (Bed Mobility Goal 1, PT) long term goal (LTG);10 days  -FW     Row Name 08/08/22 1119          Transfer Goal 1 (PT)    Activity/Assistive Device (Transfer Goal 1, PT) sit-to-stand/stand-to-sit;bed-to-chair/chair-to-bed  -FW     [place] Level/Cues Needed (Transfer Goal 1, PT) minimum assist (75% or more patient effort)  -FW     Time Frame (Transfer Goal 1, PT) long term goal (LTG);10 days  -FW     Row Name 08/08/22 1119          Gait Training Goal 1 (PT)    Activity/Assistive Device (Gait Training Goal 1, PT) gait (walking locomotion);assistive device use;diminish gait deviation;decrease fall risk;walker, erinn;walker, rolling platform  -FW     [place] Level (Gait Training Goal 1, PT) contact guard required  -FW     Distance (Gait Training Goal 1, PT) 25  -FW     Time Frame (Gait Training Goal 1, PT) long term goal (LTG);10 days  -FW     Row Name 08/08/22 1119          Therapy Assessment/Plan (PT)    Planned Therapy Interventions (PT) balance training;bed mobility training;gait training;home exercise program;joint mobilization;orthotic fitting/training;neuromuscular re-education;patient/family education;postural re-education;ROM (range of motion);strengthening;stretching  -FW           User Key  (r) = Recorded By, (t) = Taken By, (c) = Cosigned By    Initials Name Provider Type    FW Naveen Silva, PT Physical Therapist               Clinical Impression     Row Name 08/08/22 1110          Pain    Pretreatment Pain Rating 0/10 - no pain  -FW     Posttreatment Pain Rating 4/10  -FW     Pain Location - Side/Orientation Left  -FW     Pain Location - extremity;elbow;knee  -FW     Pre/Posttreatment Pain Comment Pt noted increased LUE pain while upright and increased LLE pain while WB and w/ bed  mobility  -FW     Pain Intervention(s) Repositioned;Ambulation/increased activity  -FW     Row Name 08/08/22 1110          Plan of Care Review    Plan of Care Reviewed With patient  -FW     Outcome Evaluation Pt evaluation limited due to low BP and c/o severe dizziness. Pt presents with decreased balance, pain, and  functional strength/activity tolerance defiits limiting her mobility. Pt mod Ax2 for bed mobility, mod Ax2 for STS, and mod Ax2 for SPT from BSC to bed. Pt with complaints of dizziness w/ BP reading at 70/48 while in a seated position. Pt noted subsiding symptoms after being returned to bed in a supine position. IPPT warranted. Recommend dc SNF with potential to dc to IPR with improved tolerance for therapy.  -FW     Row Name 08/08/22 1110          Therapy Assessment/Plan (PT)    Patient/Family Therapy Goals Statement (PT) to return home with   -FW     Rehab Potential (PT) good, to achieve stated therapy goals  -FW     Criteria for Skilled Interventions Met (PT) yes;skilled treatment is necessary  -FW     Therapy Frequency (PT) daily  -FW     Row Name 08/08/22 1110          Vital Signs    Intra Systolic BP Rehab 70  -FW     Intra Treatment Diastolic BP 48   -FW     Post Systolic BP Rehab 139  -FW     Post Treatment Diastolic BP 87  -FW     O2 Delivery Pre Treatment room air  -FW     O2 Delivery Intra Treatment room air  -FW     O2 Delivery Post Treatment room air  -FW     Pre Patient Position Supine  -FW     Intra Patient Position Standing  -FW     Post Patient Position Supine  -FW     Row Name 08/08/22 1110          Positioning and Restraints    Pre-Treatment Position in bed  -FW     Post Treatment Position bed  -FW     In Bed notified nsg;supine;exit alarm on;encouraged to call for assist;call light within reach;with OT;SCD pump applied  -FW           User Key  (r) = Recorded By, (t) = Taken By, (c) = Cosigned By    Initials Name Provider Type    FW Naveen Silva, PT Physical Therapist                Outcome Measures     Row Name 08/08/22 1121          How much help from another person do you currently need...    Turning from your back to your side while in flat bed without using bedrails? 2  -FW     Moving from lying on back to sitting on the side of a flat bed without bedrails? 2  -FW     Moving to and from a bed to a chair (including a wheelchair)? 2  -FW     Standing up from a chair using your arms (e.g., wheelchair, bedside chair)? 2  -FW     Climbing 3-5 steps with a railing? 1  -FW     To walk in hospital room? 2  -FW     AM-PAC 6 Clicks Score (PT) 11  -FW     Highest level of mobility 4 --> Transferred to chair/commode  -FW     Row Name 08/08/22 1121          Functional Assessment    Outcome Measure Options AM-PAC 6 Clicks Basic Mobility (PT)  -           User Key  (r) = Recorded By, (t) = Taken By, (c) = Cosigned By    Initials Name Provider Type     Naveen Silva PT Physical Therapist                             Physical Therapy Education                 Title: PT OT SLP Therapies (In Progress)     Topic: Physical Therapy (In Progress)     Point: Mobility training (Done)     Learning Progress Summary           Patient Acceptance, E, VU by  at 8/8/2022 1121                   Point: Home exercise program (Not Started)     Learner Progress:  Not documented in this visit.          Point: Body mechanics (Done)     Learning Progress Summary           Patient Acceptance, E, VU by  at 8/8/2022 1121                   Point: Precautions (Done)     Learning Progress Summary           Patient Acceptance, E, VU by  at 8/8/2022 1121                               User Key     Initials Effective Dates Name Provider Type Discipline     05/05/22 -  Naveen Silva PT Physical Therapist PT              PT Recommendation and Plan  Planned Therapy Interventions (PT): balance training, bed mobility training, gait training, home exercise program, joint mobilization, orthotic fitting/training,  neuromuscular re-education, patient/family education, postural re-education, ROM (range of motion), strengthening, stretching  Plan of Care Reviewed With: patient  Outcome Evaluation: Pt evaluation limited due to low BP and c/o severe dizziness. Pt presents with decreased balance, pain, and  functional strength/activity tolerance defiits limiting her mobility. Pt mod Ax2 for bed mobility, mod Ax2 for STS, and mod Ax2 for SPT from BSC to bed. Pt with complaints of dizziness w/ BP reading at 70/48 while in a seated position. Pt noted subsiding symptoms after being returned to bed in a supine position. IPPT warranted. Recommend dc SNF with potential to dc to IPR with improved tolerance for therapy.     Time Calculation:    PT Charges     Row Name 08/08/22 1125             Time Calculation    Start Time 1000  -FW      PT Received On 08/08/22  -FW      PT Goal Re-Cert Due Date 08/18/22  -FW              Timed Charges    37047 - PT Therapeutic Activity Minutes 15  -FW              Untimed Charges    PT Eval/Re-eval Minutes 46  -FW              Total Minutes    Timed Charges Total Minutes 15  -FW      Untimed Charges Total Minutes 46  -FW       Total Minutes 61  -FW            User Key  (r) = Recorded By, (t) = Taken By, (c) = Cosigned By    Initials Name Provider Type    FW Naveen Silva PT Physical Therapist              Therapy Charges for Today     Code Description Service Date Service Provider Modifiers Qty    20815486737 HC PT THERAPEUTIC ACT EA 15 MIN 8/8/2022 Naveen Silva, PT GP 1    73188134888 HC PT EVAL LOW COMPLEXITY 4 8/8/2022 Naveen Silva, MEI GP 1          PT G-Codes  Outcome Measure Options: AM-PAC 6 Clicks Basic Mobility (PT)  AM-PAC 6 Clicks Score (PT): 11    Naveen Silva PT  8/8/2022

## 2022-08-08 NOTE — PLAN OF CARE
Goal Outcome Evaluation:  Plan of Care Reviewed With: patient        Progress: improving  Outcome Evaluation: Alert, oriented x 4, and pleasant. VSS. Up with assist with PT, limited by low BP and nausea. LUE elevated on pillow. Voiding well per purewick. Will continue to monitor.

## 2022-08-08 NOTE — PROGRESS NOTES
Orthopedic Daily Progress Note      CC: SIGIFREDO overnight    Pain well controlled  General: no fevers, chills  Abdomen: no nausea, vomiting, or diarrhea    No other complaints    Physical Exam:  I have reviewed the vital signs.  Temp:  [98.1 °F (36.7 °C)-98.8 °F (37.1 °C)] 98.1 °F (36.7 °C)  Heart Rate:  [68-87] 81  Resp:  [16-18] 16  BP: (106-164)/(60-92) 106/67    Objective  General Appearance:    Alert, cooperative, no distress  Extremities: No clubbing, cyanosis, or edema to lower extremities  Pulses:  2+ in distal surgical extremity  Skin: Clean/dry/intact      Results Review:    I have reviewed the labs, radiology results and diagnostic studies:    Results from last 7 days   Lab Units 08/07/22  0827   WBC 10*3/mm3 10.66   HEMOGLOBIN g/dL 12.1   PLATELETS 10*3/mm3 174     Results from last 7 days   Lab Units 08/07/22  0827   SODIUM mmol/L 135*   POTASSIUM mmol/L 4.1   CO2 mmol/L 23.0   CREATININE mg/dL 0.70   GLUCOSE mg/dL 294*       I have reviewed the medications.    Assessment/Problem List  Left distal humerus fracture  Left patella fracture    Plan  Continue trial of nonoperative management   NWB LUE, keep splint clean and dry at all times  May use platform walker  WBAT LLE with knee immobilizer on at all times  PT/OT        Discharge Planning: I expect patient to be discharged to [facility] in TBD days.    Karen Diaz PA-C  08/08/22  08:06 EDT

## 2022-08-08 NOTE — THERAPY EVALUATION
Patient Name: Shria Rogers  : 1945    MRN: [id number]                              Today's Date: 2022       Admit Date: 2022    Visit Dx:     ICD-10-CM ICD-9-CM   1. Closed fracture of left elbow, initial encounter  S42.402A 812.40   2. Closed nondisplaced transverse fracture of left patella, initial encounter  S82.035A 822.0     Patient Active Problem List   Diagnosis   • Fall   • Type 2 diabetes mellitus (HCC)   • Hypothyroidism   • Essential hypertension   • Left supracondylar humerus fracture   • Closed fracture of left patella     Past Medical History:   Diagnosis Date   • Arthritis    • Diabetes mellitus (HCC)    • Disease of thyroid gland    • Elevated cholesterol      Past Surgical History:   Procedure Laterality Date   • APPENDECTOMY     • HYSTERECTOMY     • TONSILLECTOMY        General Information     Row Name 22 1141          OT Time and Intention    Document Type evaluation  -AR     Mode of Treatment co-treatment;occupational therapy  -AR     Row Name 22 1141          General Information    Patient Profile Reviewed yes  -AR     Prior Level of Function independent:;all household mobility;community mobility;gait;transfer;ADL's;yard work;driving  -AR     Existing Precautions/Restrictions brace on at all times;non-weight bearing;left;other (see comments)   airborne/contact, NWB LUE- may use platform walk, WBAT LLE- KI AAT, hypotensive  -AR     Barriers to Rehab none identified  -AR     Row Name 22 1141          Living Environment    People in Home spouse  -AR     Row Name 22 1141          Home Main Entrance    Number of Stairs, Main Entrance three  -AR     Stair Railings, Main Entrance none  -AR     Row Name 22 1141          Stairs Within Home, Primary    Stairs, Within Home, Primary 17 steps to access her bedroom but can stay on main level of home  -AR     Number of Stairs, Within Home, Primary other (see comments)  -AR     Row Name 22 1141           Cognition    Orientation Status (Cognition) oriented x 4  -AR     Row Name 08/08/22 1141          Safety Issues, Functional Mobility    Safety Issues Affecting Function (Mobility) insight into deficits/self-awareness;problem-solving;safety precaution awareness;safety precautions follow-through/compliance;sequencing abilities  -AR     Impairments Affecting Function (Mobility) balance;endurance/activity tolerance;pain;range of motion (ROM);strength  -AR           User Key  (r) = Recorded By, (t) = Taken By, (c) = Cosigned By    Initials Name Provider Type    Caprice Mcneill OT Occupational Therapist                 Mobility/ADL's     Row Name 08/08/22 1144          Bed Mobility    Bed Mobility supine-sit;scooting/bridging  -AR     Scooting/Bridging [place] (Bed Mobility) 2 person assist;moderate assist (50% patient effort)  -AR     Supine-Sit [place] (Bed Mobility) moderate assist (50% patient effort);2 person assist  -AR     Sit-Supine [place] (Bed Mobility) moderate assist (50% patient effort);2 person assist  -AR     Bed Mobility, Safety Issues decreased use of arms for pushing/pulling;impaired trunk control for bed mobility  -AR     Assistive Device (Bed Mobility) draw sheet;bed rails  -AR     Comment, (Bed Mobility) Educated pt on importance of maintaining NWB LUE AAT, pt with good ability to maintain.  -AR     Row Name 08/08/22 1144          Transfers    Transfers sit-stand transfer;stand-sit transfer;toilet transfer  -AR     Comment, (Transfers) Pt assisted to BSC, required cues to sequence and for BSC approach. She needed physical assist to advance LLE during stand-to-sit transition. Pt limited with c/o dizziness on BSC- BP 70/48. Pt assisted back to bed and nurse notified of hypotension.  -AR     Bed-Chair [place] (Transfers) moderate assist (50% patient effort);2 person assist  -AR     Assistive Device (Bed-Chair Transfers) other (see comments)  BENJIE HDZ  -AR     Sit-Stand  [place] (Transfers) moderate assist (50% patient effort);2 person assist;verbal cues  -AR     Stand-Sit [place] (Transfers) moderate assist (50% patient effort);2 person assist;verbal cues  -AR     [place] Level (Toilet Transfer) moderate assist (50% patient effort);2 person assist;verbal cues  -AR     Assistive Device (Toilet Transfer) commode, bedside with drop arms  -AR     Row Name 08/08/22 1144          Sit-Stand Transfer    Assistive Device (Sit-Stand Transfers) other (see comments)  -AR     Row Name 08/08/22 1144          Stand-Sit Transfer    Assistive Device (Stand-Sit Transfers) other (see comments)  -AR     Row Name 08/08/22 1144          Toilet Transfer    Type (Toilet Transfer) sit-stand;stand-sit  -AR     Row Name 08/08/22 1144          Activities of Daily Living    BADL Assessment/Intervention bathing;upper body dressing;feeding;toileting  -AR     Row Name 08/08/22 1144          Mobility    Extremity Weight-bearing Status left upper extremity;left lower extremity  -AR     Left Upper Extremity (Weight-bearing Status) non weight-bearing (NWB)  -AR     Left Lower Extremity (Weight-bearing Status) weight-bearing as tolerated (WBAT)  WBAT LLE in KI AAT  -AR     Row Name 08/08/22 1144          Bathing Assessment/Intervention    Comment, (Bathing) Pt not tolerating L shoulder ROM d/t pain. Educated her on pendulum technique for axilla care and issued LH sponge to assist.  -AR     Row Name 08/08/22 1144          Upper Body Dressing Assessment/Training    Comment, (Upper Body Dressing) Educated pt on erinn-dressing technique to improve comfort. Pt c/o L elbow pain with mobility- issued PrestoBox Ultra II sling with pillow removed for upcoming session to wear during mobility as needed.  -AR     Row Name 08/08/22 1144          Self-Feeding Assessment/Training    [place] Level (Feeding) finger foods;supervision  -AR     Position (Self-Feeding) supported sitting  -AR     Comment, (Feeding) Issued  adaptive cup  -AR     Row Name 08/08/22 1144          Toileting Assessment/Training    [place] Level (Toileting) toileting skills;dependent (less than 25% patient effort)  -AR     Assistive Devices (Toileting) commode, bedside with drop arms  -AR     Position (Toileting) supported standing;supported sitting  -AR           User Key  (r) = Recorded By, (t) = Taken By, (c) = Cosigned By    Initials Name Provider Type    Caprice Mcneill OT Occupational Therapist               Obj/Interventions     Row Name 08/08/22 1150          Sensory Assessment (Somatosensory)    Sensory Assessment (Somatosensory) UE sensation intact  -AR     [facility] Name 08/08/22 1150          Vision Assessment/Intervention    Visual Impairment/Limitations WNL;corrective lenses full-time  -AR     [facility] Name 08/08/22 1150          Range of Motion Comprehensive    Comment, General Range of Motion RUE WFL, pt unable to tolerate L shoulder ROM. minimal doigot ROM noted LUE  -AR     [facility] Name 08/08/22 1150          Strength Comprehensive (MMT)    Comment, General Manual Muscle Testing (MMT) Assessment RUE 5/5, defer LUE  -AR     Row Name 08/08/22 1150          Balance    Balance Assessment sitting static balance;sitting dynamic balance;standing static balance;standing dynamic balance  -AR     Static Sitting Balance contact guard  -AR     Dynamic Sitting Balance contact guard  -AR     Position, Sitting Balance sitting edge of bed  -AR     Static Standing Balance moderate assist  -AR     Dynamic Standing Balance moderate assist;2-person assist  -AR           User Key  (r) = Recorded By, (t) = Taken By, (c) = Cosigned By    Initials Name Provider Type    Caprice Mcneill OT Occupational Therapist               Goals/Plan     Row Name 08/08/22 1159          Transfer Goal 1 (OT)    Activity/Assistive Device (Transfer Goal 1, OT) sit-to-stand/stand-to-sit;toilet;commode, bedside with drop arms  AD per PT recommendation  -AR     [place]  Level/Cues Needed (Transfer Goal 1, OT) minimum assist (75% or more patient effort);verbal cues required  -AR     Time Frame (Transfer Goal 1, OT) long term goal (LTG);by discharge  -AR     Progress/Outcome (Transfer Goal 1, OT) goal ongoing  -AR     Row Name 08/08/22 1159          Dressing Goal 1 (OT)    Activity/Device (Dressing Goal 1, OT) lower body dressing;long-handled shoe horn;reacher  -AR     [place]/Cues Needed (Dressing Goal 1, OT) moderate assist (50-74% patient effort);verbal cues required  -AR     Time Frame (Dressing Goal 1, OT) long term goal (LTG);by discharge  -AR     Progress/Outcome (Dressing Goal 1, OT) goal ongoing  -AR     Row Name 08/08/22 1159          ROM Goal 1 (OT)    ROM Goal 1 (OT) Pt will complete L pendulum and digit HEP with CGA to support ADL function  -AR     Time Frame (ROM Goal 1, OT) long term goal (LTG);by discharge  -AR     Progress/Outcome (ROM Goal 1, OT) goal ongoing  -AR     [facility] Name 08/08/22 1159          Therapy Assessment/Plan (OT)    Planned Therapy Interventions (OT) activity tolerance training;adaptive equipment training;BADL retraining;edema control/reduction;functional balance retraining;IADL retraining;occupation/activity based interventions;orthotic fabrication/fitting/training;patient/caregiver education/training;ROM/therapeutic exercise;transfer/mobility retraining;strengthening exercise  -AR           User Key  (r) = Recorded By, (t) = Taken By, (c) = Cosigned By    Initials Name Provider Type    AR Caprice Tucker, BALDEMAR Occupational Therapist               Clinical Impression     Row Name 08/08/22 1151          Pain Assessment    Pretreatment Pain Rating 0/10 - no pain  -AR     Posttreatment Pain Rating 1/10  -AR     Pain Location - Side/Orientation Left  -AR     Pain Location upper  -AR     Pain Location - extremity  -AR     Pain Intervention(s) Repositioned;Ambulation/increased activity;Elevated  -AR     Row Name 08/08/22 1151          Plan of Care  Review    Plan of Care Reviewed With patient  -AR     Outcome Evaluation Pt alert, Ox4 and c/o minimal LUE pain at rest at end of session. She completed bed mobility with mod assist x2, transfer to [facility] with mod assist x2 and dependence post-toilet hygiene and clothing management. Pt limited with hypotension during toileting- BP 70/48 and pt symptomatic. Upon return to supine, /87. OT issued AE and educated pt on use, issued sling for mobility to improve comfort. Recommend SNF, pt in agreement.  -AR     Row Name 08/08/22 1151          Therapy Assessment/Plan (OT)    Rehab Potential (OT) good, to achieve stated therapy goals  -AR     Criteria for Skilled Therapeutic Interventions Met (OT) yes  -AR     Therapy Frequency (OT) daily  -AR     Row Name 08/08/22 1151          Therapy Plan Review/Discharge Plan (OT)    Anticipated Discharge Disposition (OT) skilled nursing facility  -AR     Row Name 08/08/22 1151          Vital Signs    Pre Systolic BP Rehab 106  -AR     Pre Treatment Diastolic BP 67  -AR     Intra Systolic BP Rehab 70  -AR     Intra Treatment Diastolic BP 48   nurse notified  -AR     Post Systolic BP Rehab 139  -AR     Post Treatment Diastolic BP 87  -AR     Post SpO2 (%) 97  -AR     O2 Delivery Post Treatment room air  -AR     Pre Patient Position Supine  -AR     Intra Patient Position Sitting  -AR     Post Patient Position Supine  -AR     Row Name 08/08/22 1151          Positioning and Restraints    Pre-Treatment Position in bed  -AR     Post Treatment Position bed  -AR     In Bed notified nsg;supine;call light within reach;encouraged to call for assist;exit alarm on;SCD pump applied;with brace;LUE elevated  -AR           User Key  (r) = Recorded By, (t) = Taken By, (c) = Cosigned By    Initials Name Provider Type    Caprice Mcneill, OT Occupational Therapist               Outcome Measures     Row Name 08/08/22 1201          How much help from another is currently needed...    Putting on and  taking off regular lower body clothing? 1  -AR     Bathing (including washing, rinsing, and drying) 1  -AR     Toileting (which includes using toilet bed pan or urinal) 1  -AR     Putting on and taking off regular upper body clothing 2  -AR     Taking care of personal grooming (such as brushing teeth) 3  -AR     Eating meals 3  -AR     AM-PAC 6 Clicks Score (OT) 11  -AR     Row Name 08/08/22 1121 08/08/22 0800       How much help from another person do you currently need...    Turning from your back to your side while in flat bed without using bedrails? 2  -FW 2  -AC    Moving from lying on back to sitting on the side of a flat bed without bedrails? 2  -FW 2  -AC    Moving to and from a bed to a chair (including a wheelchair)? 2  -FW 2  -AC    Standing up from a chair using your arms (e.g., wheelchair, bedside chair)? 2  -FW 2  -AC    Climbing 3-5 steps with a railing? 1  -FW 2  -AC    To walk in hospital room? 2  -FW 2  -AC    AM-PAC 6 Clicks Score (PT) 11  -FW 12  -AC    Highest level of mobility 4 --> Transferred to chair/commode  -FW 4 --> Transferred to chair/commode  -AC    Row Name 08/08/22 1201 08/08/22 1121       Functional Assessment    Outcome Measure Options AM-PAC 6 Clicks Daily Activity (OT)  -AR AM-PAC 6 Clicks Basic Mobility (PT)  -FW          User Key  (r) = Recorded By, (t) = Taken By, (c) = Cosigned By    Initials Name Provider Type    Caprice Mcneill OT Occupational Therapist    Kassidy Bay RN Registered Nurse    Naveen Kaur, PT Physical Therapist                Occupational Therapy Education                 Title: PT OT SLP Therapies (In Progress)     Topic: Occupational Therapy (Done)     Point: ADL training (Done)     Description:   Instruct learner(s) on proper safety adaptation and remediation techniques during self care or transfers.   Instruct in proper use of assistive devices.              Learning Progress Summary           Patient [place], BISHOP,TB,D, VU,NR by AR at  8/8/2022 1202                   Point: Home exercise program (Done)     Description:   Instruct learner(s) on appropriate technique for monitoring, assisting and/or progressing therapeutic exercises/activities.              Learning Progress Summary           Patient Eager, E,TB,D, VU,NR by AR at 8/8/2022 1202                   Point: Precautions (Done)     Description:   Instruct learner(s) on prescribed precautions during self-care and functional transfers.              Learning Progress Summary           Patient Eager, E,TB,D, VU,NR by AR at 8/8/2022 1202                   Point: Body mechanics (Done)     Description:   Instruct learner(s) on proper positioning and spine alignment during self-care, functional mobility activities and/or exercises.              Learning Progress Summary           Patient Eager, E,TB,D, VU,NR by AR at 8/8/2022 1202                               User Key     Initials Effective Dates Name Provider Type Discipline    AR 06/16/21 -  Caprice Tucker, OT Occupational Therapist OT              OT Recommendation and Plan  Planned Therapy Interventions (OT): activity tolerance training, adaptive equipment training, BADL retraining, edema control/reduction, functional balance retraining, IADL retraining, occupation/activity based interventions, orthotic fabrication/fitting/training, patient/caregiver education/training, ROM/therapeutic exercise, transfer/mobility retraining, strengthening exercise  Therapy Frequency (OT): daily  Plan of Care Review  Plan of Care Reviewed With: patient  Outcome Evaluation: Pt alert, Ox4 and c/o minimal LUE pain at rest at end of session. She completed bed mobility with mod assist x2, transfer to [facility] with mod assist x2 and dependence post-toilet hygiene and clothing management. Pt limited with hypotension during toileting- BP 70/48 and pt symptomatic. Upon return to supine, /87. OT issued AE and educated pt on use, issued sling for mobility to improve  comfort. Recommend SNF, pt in agreement.     Time Calculation:    Time Calculation- OT     Row Name 08/08/22 1202             Time Calculation- OT    OT Start Time 1040  -AR      OT Received On 08/08/22  -AR      OT Goal Re-Cert Due Date 08/18/22  -AR              Timed Charges    99780 - OT Self Care/Mgmt Minutes 25  -AR              Untimed Charges    OT Eval/Re-eval Minutes 59  -AR              Total Minutes    Timed Charges Total Minutes 25  -AR      Untimed Charges Total Minutes 59  -AR       Total Minutes 84  -AR            User Key  (r) = Recorded By, (t) = Taken By, (c) = Cosigned By    Initials Name Provider Type    AR Caprice Tucker, OT Occupational Therapist              Therapy Charges for Today     Code Description Service Date Service Provider Modifiers Qty    29842162420 HC OT SELF CARE/MGMT/TRAIN EA 15 MIN 8/8/2022 Caprice Tucker, OT GO 2    92802900409 HC OT EVAL LOW COMPLEXITY 4 8/8/2022 Caprice Tucker, OT GO 1    36859367690 HC OT THER SUPP EA 15 MIN 8/8/2022 Caprice Tucker OT GO 2               Caprice Tucker OT  8/8/2022

## 2022-08-08 NOTE — CASE MANAGEMENT/SOCIAL WORK
Discharge Planning Assessment  [facility]     Patient Name: Shira Rogers  MRN: [id number]  Today's Date: 8/8/2022    Admit Date: 8/7/2022     Discharge Needs Assessment     Row Name 08/08/22 1606       Living Environment    People in Home spouse    Name(s) of People in Home Ronen Cohen ( spouse) [phone]    Current Living Arrangements home    Primary Care Provided by self    Provides Primary Care For no one    Family Caregiver if Needed spouse    Quality of Family Relationships unable to assess    Able to Return to Prior Arrangements no    Living Arrangement Comments will need rehab unless mobility improves       Transition Planning    Patient/Family Anticipates Transition to inpatient rehabilitation facility    Patient/Family Anticipated Services at Transition rehabilitation services    Transportation Anticipated family or friend will provide       Discharge Needs Assessment    Readmission Within the Last 30 Days no previous admission in last 30 days    Equipment Currently Used at Home none    Concerns to be Addressed adjustment to diagnosis/illness;discharge planning    Anticipated Changes Related to Illness inability to care for self    Equipment Needed After Discharge walker, rolling    Outpatient/Agency/Support Group Needs skilled nursing facility    Discharge Facility/Level of Care Needs nursing facility, skilled    Current Discharge Risk physical impairment               Discharge Plan     Row Name 08/08/22 1604       Plan    Plan Inpt rehab    Plan Comments chart reviewed. I spoke with Mrs Rogers by phone to initiate d/c planning. She lives with her spouse. She states she was independent with all ADLs,driving,etc prior to admit. She uses no assistive equipment, she has a PCP. We discussed d/c options. If she needs inpt rehab,she will not be able to go until she is out of Covid isolation ( 8/17). If she improves enough with her mobility she could return home before that. We discussed inpt rehab  options. Case mgt will follow progress              Continued Care and Services - Admitted Since 8/7/2022    Coordination has not been started for this encounter.          Demographic Summary     Row Name 08/08/22 1559       General Information    Admission Type observation    Arrived From home    Referral Source physician    Reason for Consult discharge planning    Preferred Language English    General Information Comments PCP- Miguel Mazariegos       Contact Information    Permission Granted to Share Info With                Functional Status     Row Name 08/08/22 1603       Functional Status    Usual Activity Tolerance good    Current Activity Tolerance --  see therapy evals       Functional Status, IADL    Medications independent    Meal Preparation independent    Housekeeping independent    Laundry independent    Shopping independent       Mental Status    General Appearance WDL WDL       Mental Status Summary    Recent Changes in Mental Status/Cognitive Functioning no changes       Employment/    Employment Status retired    Employment/ Comments insurance- Medicare and [employer]               Psychosocial    No documentation.                Abuse/Neglect    No documentation.                Legal    No documentation.                Substance Abuse    No documentation.                Patient Forms    No documentation.                   Sonja C Kellerman, RN

## 2022-08-08 NOTE — PROGRESS NOTES
[facility] Medicine Services  PROGRESS NOTE    Patient Name: Shira Rogers  : 1945  MRN: [id number]    Date of Admission: 2022  Primary Care Physician: Miguel Mazariegos MD    Subjective   Subjective     CC:  F/u fall with fractures    HPI:  Patient resting in bed. No complaints. Asks about COVID retesting. Hasn't been up with PT    ROS:  Gen- No fevers, chills  CV- No chest pain, palpitations  Resp- No cough, dyspnea  GI- No N/V/D, abd pain    Objective   Objective     Vital Signs:   Temp:  [98.1 °F (36.7 °C)-98.8 °F (37.1 °C)] 98.2 °F (36.8 °C)  Heart Rate:  [68-87] 82  Resp:  [16-18] 16  BP: (106-131)/(60-74) 121/74     Physical Exam:  With patient's consent, physical exam was conducted via visual telemedicine encounter with bedside portion accommodated by nursing staff due to patient's current isolation requirements in the interest of PPE conservation.    Constitutional: No acute distress, awake, alert, nontoxic, normal body habitus  HENT: NCAT, MMM  Respiratory: breathing appears nonlabored on room air  Cardiovascular: tele with NSR  Psychiatric: Appropriate affect  Neurologic: Oriented x 3, speech is clear, appears to move all extremities  Skin: No visible rashes seen on exposed skin        Results Reviewed:  LAB RESULTS:      Lab 22  0807 22   WBC 7.78 10.66   HEMOGLOBIN 11.5* 12.1   HEMATOCRIT 33.7* 34.9   PLATELETS 159 174   NEUTROS ABS  --  7.95*   IMMATURE GRANS (ABS)  --  0.04   LYMPHS ABS  --  1.78   MONOS ABS  --  0.63   EOS ABS  --  0.22   MCV 85.3 85.5         Lab 22  0807 22  08   SODIUM 134* 135*   POTASSIUM 4.3 4.1   CHLORIDE 102 103   CO2 24.0 23.0   ANION GAP 8.0 9.0   BUN 14 23   CREATININE 0.76 0.70   EGFR 81.8 90.3   GLUCOSE 286* 294*   CALCIUM 8.5* 9.0   HEMOGLOBIN A1C  --  13.30*   TSH  --  13.200*         Lab 22  0827   TOTAL PROTEIN 6.7   ALBUMIN 4.00   GLOBULIN 2.7   ALT (SGPT) 15   AST (SGOT) 17   BILIRUBIN  0.3   ALK PHOS 91                     Brief Urine Lab Results     None          Microbiology Results Abnormal     None          XR ELBOW 2 VIEW LEFT    Result Date: 8/7/2022  DATE OF EXAM: 8/7/2022 7:39 AM  PROCEDURE: XR ELBOW 2 VW LEFT-  INDICATIONS: fall  COMPARISON: No comparisons available.  TECHNIQUE: Two Radiologic views of the left elbow were obtained.  FINDINGS: There is an acute supracondylar fracture of the distal humerus which on AP view appears to span transversely just proximal from the medial to the lateral epicondyles. Moderate effusion is present.      Impression: There is an acute supracondylar fracture of the distal humerus which on AP view appears to span transversely just proximal from the medial to the lateral epicondyles. Moderate effusion is present.  This report was finalized on 8/7/2022 8:06 AM by Isac Avelar.      XR Knee 1 or 2 View Left    Result Date: 8/7/2022  DATE OF EXAM: 8/7/2022 7:39 AM  PROCEDURE: XR KNEE 1 OR 2 VW LEFT-  INDICATIONS: left knee  COMPARISON: No comparisons available.  TECHNIQUE: One to two radiologic views of left knee were obtained.  FINDINGS: There is cortical disruption along the superior pole of the patella concerning for acute minimally displaced fracture. Cortical margins are otherwise intact and alignment appears grossly maintained. 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 There is a moderate to large dense suprapatellar effusion, likely some component of hemarthrosis.      Impression: There is cortical disruption along the superior pole of the patella concerning for acute minimally displaced fracture. Cortical margins are otherwise intact and alignment appears grossly maintained. 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 There is a moderate to large dense suprapatellar effusion, likely some component of hemarthrosis.  This report was finalized on 8/7/2022 8:02 AM by Iasc Avelar.            I have reviewed  "the medications:  Scheduled Meds:insulin lispro, 0-7 Units, Subcutaneous, TID AC  senna-docusate sodium, 2 tablet, Oral, BID  sodium chloride, 10 mL, Intravenous, Q12H      Continuous Infusions:   PRN Meds:.•  senna-docusate sodium **AND** polyethylene glycol **AND** bisacodyl **AND** bisacodyl  •  dextrose  •  dextrose  •  glucagon (human recombinant)  •  HYDROcodone-acetaminophen  •  ondansetron  •  [COMPLETED] Insert peripheral IV **AND** sodium chloride  •  sodium chloride    Assessment & Plan   Assessment & Plan     Active Hospital Problems    Diagnosis  POA   • **Fall [W19.XXXA]  Yes   • Type 2 diabetes mellitus (HCC) [E11.9]  Yes   • Hypothyroidism [E03.9]  Yes   • Essential hypertension [I10]  Yes   • Left supracondylar humerus fracture [S42.412A]  Yes   • Closed fracture of left patella [S82.002A]  Yes      Resolved Hospital Problems   No resolved problems to display.        Brief Hospital Course to date:  Shira Rogers is a 77 y.o. female with history of hypertension, hyperlipidemia, type 2 diabetes, hypothyroidism, chronic dizziness who presents to the ED status post fall, found to have  Left patella and left humerus fracture     Left patella fracture, left distal humerus fracture s/p fall  -Mechanism of fall unknown, likely mechanical, patient with history of dizziness  -Left elbow and knee x-ray as above  -Ortho has been consulted, recommend nonoperative management for now,  nonweightbearing LUE, WBAT LLE with knee immobilizer  -Continue pain control, PT/OT evaluations     Type 2 diabetes  -patient notes recent \"lecture by my PCP\" for uncontrolled diabetes  - A1c is 13.3, on Toujeo at home     Hyperlipidemia  - continue statin     Hypothyroidism  -TSH is elevated, increase synthroid dose. Unclear compliance. Recheck in 6-8 weeks     Hypertension  -BP currently stable, not on any meds    COVID:  - incidentally positive on screening, vaccinated and boosted. Asymptomatic, no current treatment indication. " Isolation 10 days.    Expected Discharge Location and Transportation: Home vs. rehab  Expected Discharge Date: 1-2 days    DVT prophylaxis:  Mechanical DVT prophylaxis orders are present.     AM-PAC 6 Clicks Score (PT): 11 (08/08/22 1121)    CODE STATUS:   Code Status and Medical Interventions:   Ordered at: 08/07/22 1113     Level Of Support Discussed With:    Patient     Code Status (Patient has no pulse and is not breathing):    CPR (Attempt to Resuscitate)     Medical Interventions (Patient has pulse or is breathing):    Full Support       Yesi Morejon DO  08/08/22

## 2022-08-08 NOTE — PLAN OF CARE
Goal Outcome Evaluation:  Plan of Care Reviewed With: patient           Outcome Evaluation: Pt alert, Ox4 and c/o minimal LUE pain at rest at end of session. She completed bed mobility with mod assist x2, transfer to BSC with mod assist x2 and dependence post-toilet hygiene and clothing management. Pt limited with hypotension during toileting- BP 70/48 and pt symptomatic. Upon return to supine, /87. OT issued AE and educated pt on use, issued sling for mobility to improve comfort. Recommend SNF, pt in agreement.

## 2022-08-09 PROBLEM — S42.402A CLOSED FRACTURE OF LEFT ELBOW, INITIAL ENCOUNTER: Status: ACTIVE | Noted: 2022-08-09

## 2022-08-09 LAB
GLUCOSE BLDC GLUCOMTR-MCNC: 231 MG/DL (ref 70–130)
GLUCOSE BLDC GLUCOMTR-MCNC: 324 MG/DL (ref 70–130)
GLUCOSE BLDC GLUCOMTR-MCNC: 328 MG/DL (ref 70–130)
SARS-COV-2 RNA PNL SPEC NAA+PROBE: NOT DETECTED

## 2022-08-09 PROCEDURE — 97110 THERAPEUTIC EXERCISES: CPT

## 2022-08-09 PROCEDURE — 97116 GAIT TRAINING THERAPY: CPT

## 2022-08-09 PROCEDURE — U0004 COV-19 TEST NON-CDC HGH THRU: HCPCS | Performed by: PHYSICIAN ASSISTANT

## 2022-08-09 PROCEDURE — 97530 THERAPEUTIC ACTIVITIES: CPT

## 2022-08-09 PROCEDURE — 63710000001 INSULIN LISPRO (HUMAN) PER 5 UNITS: Performed by: INTERNAL MEDICINE

## 2022-08-09 PROCEDURE — 82962 GLUCOSE BLOOD TEST: CPT

## 2022-08-09 PROCEDURE — 99232 SBSQ HOSP IP/OBS MODERATE 35: CPT | Performed by: PHYSICIAN ASSISTANT

## 2022-08-09 PROCEDURE — 63710000001 INSULIN DETEMIR PER 5 UNITS: Performed by: PHYSICIAN ASSISTANT

## 2022-08-09 RX ORDER — GLIPIZIDE 5 MG/1
2.5 TABLET ORAL
Status: DISCONTINUED | OUTPATIENT
Start: 2022-08-09 | End: 2022-08-09

## 2022-08-09 RX ORDER — GLIPIZIDE 5 MG/1
2.5 TABLET ORAL
Status: DISCONTINUED | OUTPATIENT
Start: 2022-08-09 | End: 2022-08-10

## 2022-08-09 RX ORDER — AZELASTINE 1 MG/ML
1 SPRAY, METERED NASAL 2 TIMES DAILY
Status: DISCONTINUED | OUTPATIENT
Start: 2022-08-09 | End: 2022-08-12 | Stop reason: HOSPADM

## 2022-08-09 RX ADMIN — ATORVASTATIN CALCIUM 40 MG: 40 TABLET, FILM COATED ORAL at 20:00

## 2022-08-09 RX ADMIN — HYDROCODONE BITARTRATE AND ACETAMINOPHEN 1 TABLET: 5; 325 TABLET ORAL at 19:57

## 2022-08-09 RX ADMIN — GLIPIZIDE 2.5 MG: 5 TABLET ORAL at 17:12

## 2022-08-09 RX ADMIN — Medication 10 ML: at 20:00

## 2022-08-09 RX ADMIN — SENNOSIDES AND DOCUSATE SODIUM 2 TABLET: 50; 8.6 TABLET ORAL at 20:00

## 2022-08-09 RX ADMIN — LEVOTHYROXINE SODIUM 100 MCG: 0.1 TABLET ORAL at 05:55

## 2022-08-09 RX ADMIN — Medication 10 ML: at 09:00

## 2022-08-09 RX ADMIN — METFORMIN HYDROCHLORIDE 1000 MG: 1000 TABLET ORAL at 17:12

## 2022-08-09 RX ADMIN — INSULIN LISPRO 2 UNITS: 100 INJECTION, SOLUTION INTRAVENOUS; SUBCUTANEOUS at 08:47

## 2022-08-09 RX ADMIN — SENNOSIDES AND DOCUSATE SODIUM 2 TABLET: 50; 8.6 TABLET ORAL at 08:47

## 2022-08-09 RX ADMIN — INSULIN DETEMIR 15 UNITS: 100 INJECTION, SOLUTION SUBCUTANEOUS at 20:00

## 2022-08-09 RX ADMIN — HYDROCODONE BITARTRATE AND ACETAMINOPHEN 1 TABLET: 5; 325 TABLET ORAL at 08:48

## 2022-08-09 RX ADMIN — METFORMIN HYDROCHLORIDE 1000 MG: 1000 TABLET ORAL at 11:56

## 2022-08-09 RX ADMIN — AZELASTINE HYDROCHLORIDE 1 SPRAY: 137 SPRAY, METERED NASAL at 11:56

## 2022-08-09 NOTE — PLAN OF CARE
Problem: Fall Injury Risk  Goal: Absence of Fall and Fall-Related Injury  Outcome: Ongoing, Progressing  Intervention: Identify and Manage Contributors  Recent Flowsheet Documentation  Taken 8/8/2022 2000 by Shadi Wade, RN  Medication Review/Management: medications reviewed  Intervention: Promote Injury-Free Environment  Recent Flowsheet Documentation  Taken 8/8/2022 2000 by Shadi Wade, RN  Safety Promotion/Fall Prevention:   clutter free environment maintained   assistive device/personal items within reach   fall prevention program maintained   mobility aid in reach   Goal Outcome Evaluation:

## 2022-08-09 NOTE — PLAN OF CARE
Goal Outcome Evaluation:  Plan of Care Reviewed With: patient           Outcome Evaluation: Pt continues with c/o dizziness when OOB. Pt performed bed mobility mod Ax2, STS mod Ax2, and ambulated 10' min Ax2 w/ a erinn-walker. Recommend chair follow during gait as pt has been hypotensive.Pt gait limited by complaints of dizziness,fatigue, and pain. Continue PT POC. Recommend dc SNF

## 2022-08-09 NOTE — THERAPY TREATMENT NOTE
Patient Name: Shira Rogers  : 1945    MRN: [id number]                              Today's Date: 2022       Admit Date: 2022    Visit Dx:     ICD-10-CM ICD-9-CM   1. Closed fracture of left elbow, initial encounter  S42.402A 812.40   2. Closed nondisplaced transverse fracture of left patella, initial encounter  S82.035A 822.0     Patient Active Problem List   Diagnosis   • Fall   • Type 2 diabetes mellitus (HCC)   • Hypothyroidism   • Essential hypertension   • Left supracondylar humerus fracture   • Closed fracture of left patella   • Closed fracture of left elbow, initial encounter     Past Medical History:   Diagnosis Date   • Arthritis    • Diabetes mellitus (HCC)    • Disease of thyroid gland    • Elevated cholesterol      Past Surgical History:   Procedure Laterality Date   • APPENDECTOMY     • HYSTERECTOMY     • TONSILLECTOMY        General Information     Row Name 22 1623          Physical Therapy Time and Intention    Document Type therapy note (daily note)  -FW     Mode of Treatment physical therapy  -FW     Row Name 22 1623          General Information    Patient Profile Reviewed yes  -FW     Existing Precautions/Restrictions --  airborne/contact, NWB LUE- may use platform walk, WBAT LLE- KI AAT, hypotensive  -FW     Row Name 22 1623          Cognition    Orientation Status (Cognition) oriented x 4  -FW     Row Name 22 1623          Safety Issues, Functional Mobility    Impairments Affecting Function (Mobility) balance;endurance/activity tolerance;pain;range of motion (ROM);strength  -FW           User Key  (r) = Recorded By, (t) = Taken By, (c) = Cosigned By    Initials Name Provider Type    FW Naveen Silva PT Physical Therapist               Mobility     Row Name 22 1624          Bed Mobility    Bed Mobility supine-sit;scooting/bridging  -FW     Scooting/Bridging [place] (Bed Mobility) 2 person assist;moderate assist (50% patient effort)  -FW      Supine-Sit [place] (Bed Mobility) moderate assist (50% patient effort);2 person assist  -     Assistive Device (Bed Mobility) draw sheet;bed rails  -     Row Name 08/09/22 1624          Sit-Stand Transfer    Sit-Stand [place] (Transfers) moderate assist (50% patient effort);2 person assist;verbal cues  -     Assistive Device (Sit-Stand Transfers) walker, erinn  -FW     Row Name 08/09/22 1624          Gait/Stairs (Locomotion)    [place] Level (Gait) verbal cues;nonverbal cues (demo/gesture);minimum assist (75% patient effort);2 person assist;1 person to manage equipment  -FW     Assistive Device (Gait) walker, erinn  -FW     Distance in Feet (Gait) 10  -FW     Deviations/Abnormal Patterns (Gait) bilateral deviations;antalgic;kevin decreased;gait speed decreased;stride length decreased;weight shifting decreased  -FW     Bilateral Gait Deviations forward flexed posture;decreased arm swing  -FW     Comment, (Gait/Stairs) vc for erinn-walker placement/sequencing; difficulty accepting weight on L during stance phase. Gait limited by c/o dizziness and pain. Chair follow  -     Row Name 08/09/22 1624          Mobility    Extremity Weight-bearing Status left upper extremity;left lower extremity  -FW     Left Upper Extremity (Weight-bearing Status) non weight-bearing (NWB)  -FW     Left Lower Extremity (Weight-bearing Status) weight-bearing as tolerated (WBAT)  -           User Key  (r) = Recorded By, (t) = Taken By, (c) = Cosigned By    Initials Name Provider Type     Naveen Silva PT Physical Therapist               Obj/Interventions     Row Name 08/09/22 1626          Motor Skills    Therapeutic Exercise hip;knee;ankle  -[facility] Name 08/09/22 1626          Hip (Therapeutic Exercise)    Hip (Therapeutic Exercise) isometric exercises;strengthening exercise  -     Hip Isometrics (Therapeutic Exercise) gluteal sets;10 repetitions  -     Hip Strengthening (Therapeutic Exercise)  left;aBduction;aDduction;10 repetitions  -FW     Row Name 08/09/22 1626          Knee (Therapeutic Exercise)    Knee (Therapeutic Exercise) isometric exercises;strengthening exercise  -FW     Knee Isometrics (Therapeutic Exercise) left;quad sets;10 repetitions  -FW     Knee Strengthening (Therapeutic Exercise) left;SLR (straight leg raise);10 repetitions  AA  -FW     Row Name 08/09/22 1626          Ankle (Therapeutic Exercise)    Ankle (Therapeutic Exercise) AROM (active range of motion)  -FW     Ankle AROM (Therapeutic Exercise) bilateral;dorsiflexion;plantarflexion;10 repetitions  -FW     Row Name 08/09/22 1626          Balance    Balance Assessment sitting static balance;sitting dynamic balance;standing static balance;standing dynamic balance  -FW     Static Sitting Balance standby assist  -FW     Dynamic Sitting Balance contact guard  -FW     Position, Sitting Balance sitting edge of bed;sitting in chair  -FW     Static Standing Balance contact guard  -FW     Dynamic Standing Balance minimal assist  -FW     Position/Device Used, Standing Balance supported;walker, erinn  -FW           User Key  (r) = Recorded By, (t) = Taken By, (c) = Cosigned By    Initials Name Provider Type    FW Naveen Silva PT Physical Therapist               Goals/Plan    No documentation.                Clinical Impression     Row Name 08/09/22 1629          Pain    Pretreatment Pain Rating 2/10  -FW     Posttreatment Pain Rating 5/10  -FW     Pain Location - Side/Orientation Left  -     Pain Location - elbow;knee  -FW     Pain Intervention(s) Ambulation/increased activity;Repositioned  -     Row Name 08/09/22 1629          Plan of Care Review    Plan of Care Reviewed With patient  -     Outcome Evaluation Pt continues with c/o dizziness when OOB. Pt performed bed mobility mod Ax2, STS mod Ax2, and ambulated 10' min Ax2 w/ a erinn-walker. Recommend chair follow during gait as pt has been hypotensive.Pt gait limited by complaints  of dizziness,fatigue, and pain. Continue PT POC. Recommend dc SNF  -FW     Row Name 08/09/22 1629          Vital Signs    Pre Systolic BP Rehab 127  -FW     Pre Treatment Diastolic BP 72  -FW     Post Systolic BP Rehab 104  -FW     Post Treatment Diastolic BP 78  -FW     O2 Delivery Pre Treatment room air  -FW     O2 Delivery Intra Treatment room air  -FW     O2 Delivery Post Treatment room air  -FW     Pre Patient Position Supine  -FW     Intra Patient Position Standing  -FW     Post Patient Position Sitting  -FW     Row Name 08/09/22 1629          Positioning and Restraints    Pre-Treatment Position in bed  -FW     Post Treatment Position chair  -FW     In Chair notified nsg;reclined;sitting;encouraged to call for assist;exit alarm on;call light within reach;legs elevated  -FW           User Key  (r) = Recorded By, (t) = Taken By, (c) = Cosigned By    Initials Name Provider Type    FW Naveen Silva, MEI Physical Therapist               Outcome Measures     Row Name 08/09/22 1632 08/09/22 0800       How much help from another person do you currently need...    Turning from your back to your side while in flat bed without using bedrails? 2  -FW 2  -AC    Moving from lying on back to sitting on the side of a flat bed without bedrails? 2  -FW 2  -AC    Moving to and from a bed to a chair (including a wheelchair)? 2  -FW 2  -AC    Standing up from a chair using your arms (e.g., wheelchair, bedside chair)? 2  -FW 2  -AC    Climbing 3-5 steps with a railing? 1  -FW 2  -AC    To walk in hospital room? 3  -FW 2  -AC    AM-PAC 6 Clicks Score (PT) 12  -FW 12  -AC    Highest level of mobility 4 --> Transferred to chair/commode  -FW 4 --> Transferred to chair/commode  -AC    Row Name 08/09/22 1632          Functional Assessment    Outcome Measure Options AM-PAC 6 Clicks Basic Mobility (PT)  -FW           User Key  (r) = Recorded By, (t) = Taken By, (c) = Cosigned By    Initials Name Provider Type    AC Kassidy Tapia RN  Registered Nurse    Naveen Kaur, MEI Physical Therapist                             Physical Therapy Education                 Title: PT OT SLP Therapies (Done)     Topic: Physical Therapy (Done)     Point: Mobility training (Done)     Learning Progress Summary           Patient Acceptance, E, VU by FW at 8/9/2022 1633    Acceptance, E, VU by FW at 8/8/2022 1121                   Point: Home exercise program (Done)     Learning Progress Summary           Patient Acceptance, E, VU by FW at 8/9/2022 1633                   Point: Body mechanics (Done)     Learning Progress Summary           Patient Acceptance, E, VU by FW at 8/9/2022 1633    Acceptance, E, VU by FW at 8/8/2022 1121                   Point: Precautions (Done)     Learning Progress Summary           Patient Acceptance, E, VU by FW at 8/9/2022 1633    Acceptance, E, VU by FW at 8/8/2022 1121                               User Key     Initials Effective Dates Name Provider Type Discipline     05/05/22 -  Naveen Silva PT Physical Therapist PT              PT Recommendation and Plan  Planned Therapy Interventions (PT): balance training, bed mobility training, gait training, home exercise program, joint mobilization, orthotic fitting/training, neuromuscular re-education, patient/family education, postural re-education, ROM (range of motion), strengthening, stretching  Plan of Care Reviewed With: patient  Outcome Evaluation: Pt continues with c/o dizziness when OOB. Pt performed bed mobility mod Ax2, STS mod Ax2, and ambulated 10' min Ax2 w/ a erinn-walker. Recommend chair follow during gait as pt has been hypotensive.Pt gait limited by complaints of dizziness,fatigue, and pain. Continue PT POC. Recommend dc SNF     Time Calculation:    PT Charges     Row Name 08/09/22 1635             Time Calculation    Start Time 1540  -FW      PT Received On 08/09/22 -FW      PT Goal Re-Cert Due Date 08/18/22 -FW              Timed Charges    69924 - PT  Therapeutic Exercise Minutes 8  -FW      48705 - Gait Training Minutes  15  -FW      69986 - PT Therapeutic Activity Minutes 17  -FW              Total Minutes    Timed Charges Total Minutes 40  -FW       Total Minutes 40  -FW            User Key  (r) = Recorded By, (t) = Taken By, (c) = Cosigned By    Initials Name Provider Type    FW Naveen Silva, PT Physical Therapist              Therapy Charges for Today     Code Description Service Date Service Provider Modifiers Qty    18531657307 HC PT THERAPEUTIC ACT EA 15 MIN 8/8/2022 Naveen Silva, PT GP 1    11037414872 HC PT EVAL LOW COMPLEXITY 4 8/8/2022 Naveen Silva, PT GP 1    71555004523 HC PT THER PROC EA 15 MIN 8/9/2022 Naveen Silva, PT GP 1    16243141944 HC GAIT TRAINING EA 15 MIN 8/9/2022 Naveen Silva, PT GP 1    57393729163 HC PT THERAPEUTIC ACT EA 15 MIN 8/9/2022 Naveen Silva, PT GP 1    54536212623 HC PT THER SUPP EA 15 MIN 8/9/2022 Naveen Silva, PT GP 3          PT G-Codes  Outcome Measure Options: AM-PAC 6 Clicks Basic Mobility (PT)  AM-PAC 6 Clicks Score (PT): 12  AM-PAC 6 Clicks Score (OT): 11    Naveen Silva PT  8/9/2022

## 2022-08-09 NOTE — PLAN OF CARE
Goal Outcome Evaluation:  Plan of Care Reviewed With: patient        Progress: improving  Outcome Evaluation: Alert, oriented x 4, and pleasant. VSS. Up to the chair with assist. Voiding well per purewick. PO medication controlling pain. Airborne isolation maintained. Will continue to monitor.

## 2022-08-09 NOTE — PROGRESS NOTES
[facility] Medicine Services  PROGRESS NOTE    Patient Name: Shira Rogers  : 1945  MRN: [id number]    Date of Admission: 2022  Primary Care Physician: Miguel Mazariegos MD    Subjective     CC: f/u L patella fracture, L distal humerus fracture, COVID (+)     HPI:  Laying in bed. Slept well last night. Pain is well-controlled. No chest pain or dyspnea. She would like to be re-tested for COVID - says she has been extremely careful and is very surprised that she tested positive. She would prefer to return home but understands that she has required assist x 2 with therapy and rehab has been recommended.     ROS:  Gen- No fevers, chills  CV- No chest pain, palpitations  Resp- No cough, dyspnea  GI- No N/V/D, abd pain    Objective     Vital Signs:   Temp:  [98 °F (36.7 °C)-98.7 °F (37.1 °C)] 98.1 °F (36.7 °C)  Heart Rate:  [78-87] 79  Resp:  [16] 16  BP: (116-156)/(65-81) 119/73     Physical Exam:  Constitutional: No acute distress, awake, alert and conversant. Sitting upright in bed   HENT: NCAT, mucous membranes moist  Respiratory: Clear to auscultation bilaterally, respiratory effort normal on room air   Cardiovascular: RRR, no murmurs, rubs, or gallops  Gastrointestinal: Positive bowel sounds, soft, nontender, nondistended  Musculoskeletal: No bilateral ankle edema  Psychiatric: Appropriate affect, cooperative  Neurologic: Oriented x 3, moves all extremities spontaneously without focal deficits, speech clear  Skin: No rashes    Results Reviewed:  LAB RESULTS:      Lab 22  0807 22  08   WBC 7.78 10.66   HEMOGLOBIN 11.5* 12.1   HEMATOCRIT 33.7* 34.9   PLATELETS 159 174   NEUTROS ABS  --  7.95*   IMMATURE GRANS (ABS)  --  0.04   LYMPHS ABS  --  1.78   MONOS ABS  --  0.63   EOS ABS  --  0.22   MCV 85.3 85.5         Lab 22  0807 22  0827   SODIUM 134* 135*   POTASSIUM 4.3 4.1   CHLORIDE 102 103   CO2 24.0 23.0   ANION GAP 8.0 9.0   BUN 14 23   CREATININE  0.76 0.70   EGFR 81.8 90.3   GLUCOSE 286* 294*   CALCIUM 8.5* 9.0   HEMOGLOBIN A1C  --  13.30*   TSH  --  13.200*         Lab 08/07/22  0827   TOTAL PROTEIN 6.7   ALBUMIN 4.00   GLOBULIN 2.7   ALT (SGPT) 15   AST (SGOT) 17   BILIRUBIN 0.3   ALK PHOS 91     Brief Urine Lab Results     None        Microbiology Results Abnormal     None        No radiology results from the last 24 hrs    I have reviewed the medications:  Scheduled Meds:atorvastatin, 40 mg, Oral, Nightly  azelastine, 1 spray, Each Nare, BID  glipizide, 2.5 mg, Oral, BID AC  insulin detemir, 15 Units, Subcutaneous, Nightly  levothyroxine, 100 mcg, Oral, Q AM  metFORMIN, 1,000 mg, Oral, BID With Meals  senna-docusate sodium, 2 tablet, Oral, BID  sodium chloride, 10 mL, Intravenous, Q12H      Continuous Infusions:   PRN Meds:.senna-docusate sodium **AND** polyethylene glycol **AND** bisacodyl **AND** bisacodyl  •  dextrose  •  dextrose  •  glucagon (human recombinant)  •  HYDROcodone-acetaminophen  •  meclizine  •  ondansetron  •  [COMPLETED] Insert peripheral IV **AND** sodium chloride  •  sodium chloride    Assessment & Plan   Assessment & Plan     Active Hospital Problems    Diagnosis  POA   • **Fall [W19.XXXA]  Yes   • Closed fracture of left elbow, initial encounter [S42.402A]  Yes   • Type 2 diabetes mellitus (HCC) [E11.9]  Yes   • Hypothyroidism [E03.9]  Yes   • Essential hypertension [I10]  Yes   • Left supracondylar humerus fracture [S42.412A]  Yes   • Closed fracture of left patella [S82.002A]  Yes      Resolved Hospital Problems   No resolved problems to display.     Brief Hospital Course to date:  Shira Rogers is a 77 y.o. female with with PMH significant for HTN, HLD, chronic intermittent dizziness, hypothyroidism and poorly-controlled insulin-dependent DMII. She was admitted to [facility] 8/7/22 for L patella and L distal humerus fracture secondary to a mechanical fall at home     Fall   Acute L patellar fracture  Acute L distal  humerus fracture  - Mechanism of fall unknown, likely mechanical  - L knee XR showed acute minimally displaced fracture of patella   - L elbow XR showed acute supracondylar fracture of distal humerus   - Appreciate orthopedic surgery evaluation - recommend non-operative management for now  - NWB to LUE. WBAT to LLE with knee immobilizer   - Continue PRN pain control  - PT/OT following - recommend rehab    COVID +  - Incidentally tested positive on admission screening test  - Patient is vaccinated and boosted  - She is asymptomatic and no treatment is indicated. Isolation x 10 days  - Patient concerned her test may be a false positive given her very cautious approach to life since COVID and vaccination status   - Discussed with infection control. First test was Roche assay, will test with [place] today. If positive, will need to remain in isolation. If negative, plan for Ceph[facility] tomorrow.     Poorly-controlled insulin-dependent DMII    - Patient notes recent dietary indiscretion and poor medication compliance. She reports she was recently lectured by her PCP for uncontrolled DM and A1c 13.3%  - On Toujeo 34 units QHS, Glimepiride 2mg daily and Metformin 1000mg BID  - Increase Levemir to 15 units QHS, add Glipizide 2.5mg BID and Meftormin 1000mg BID  - Hold SSI for now and see how she does      Hyperlipidemia  - Continue statin     Hypothyroidism  - TSH elevated at 13.2, free T4 0.90 - question compliance given   - Synthroid increased to 100mcg this admission. Will need repeat TSH in 4-6 weeks     Hypertension  - BP currently stable, not on any meds    Expected Discharge Location and Transportation: rehab  Expected Discharge Date: 8/18 unless repeat COVID tests negative and she can DC prior to 10 days of isolation     DVT prophylaxis:Mechanical DVT prophylaxis orders are present.     AM-PAC 6 Clicks Score (PT): 12 (08/09/22 0800)    CODE STATUS:   Code Status and Medical Interventions:   Ordered at: 08/07/22 9597      Level Of Support Discussed With:    Patient     Code Status (Patient has no pulse and is not breathing):    CPR (Attempt to Resuscitate)     Medical Interventions (Patient has pulse or is breathing):    Full Support     Aleida Justin PA-C  08/09/22

## 2022-08-09 NOTE — PROGRESS NOTES
"Orthopedic Daily Progress Note      CC: SIGIFREDO overnight. Improved ability to participate in PT today.    Pain well controlled  General: no fevers, chills  Abdomen: no nausea, vomiting, or diarrhea    No other complaints    Physical Exam:  I have reviewed the vital signs.  Temp:  [98 °F (36.7 °C)-98.6 °F (37 °C)] 98 °F (36.7 °C)  Heart Rate:  [78-90] 90  Resp:  [16] 16  BP: (116-135)/(65-81) 127/72    Objective:  Vital signs: (most recent): Blood pressure 127/72, pulse 90, temperature 98 °F (36.7 °C), temperature source Oral, resp. rate 16, height 162.6 cm (64\"), weight 61.2 kg (135 lb), SpO2 93 %.            General Appearance:    Alert, cooperative, no distress  Extremities: No clubbing, cyanosis, or edema to lower extremities  Pulses:  2+ in distal surgical extremity  Skin: Clean/dry/intact      Results Review:    I have reviewed the labs, radiology results and diagnostic studies:    Results from last 7 days   Lab Units 08/08/22  0807   WBC 10*3/mm3 7.78   HEMOGLOBIN g/dL 11.5*   PLATELETS 10*3/mm3 159     Results from last 7 days   Lab Units 08/08/22  0807   SODIUM mmol/L 134*   POTASSIUM mmol/L 4.3   CO2 mmol/L 24.0   CREATININE mg/dL 0.76   GLUCOSE mg/dL 286*       I have reviewed the medications.    Assessment/Problem List  Left distal humerus fracture  Left patella fracture    Plan  Continue trial of nonoperative management   NWB LUE, keep splint clean and dry at all times  May use platform walker  WBAT LLE with knee immobilizer on at all times  PT/OT    Please have patient follow up with Karen Diaz PA-C in 1 week. If patient is still in house, we will re-evaluate with XRs early next week.    Edilson Allison Jr., MD  08/09/22  18:11 EDT            "

## 2022-08-10 LAB
GLUCOSE BLDC GLUCOMTR-MCNC: 176 MG/DL (ref 70–130)
GLUCOSE BLDC GLUCOMTR-MCNC: 184 MG/DL (ref 70–130)
GLUCOSE BLDC GLUCOMTR-MCNC: 306 MG/DL (ref 70–130)
GLUCOSE BLDC GLUCOMTR-MCNC: 343 MG/DL (ref 70–130)

## 2022-08-10 PROCEDURE — 99232 SBSQ HOSP IP/OBS MODERATE 35: CPT | Performed by: PHYSICIAN ASSISTANT

## 2022-08-10 PROCEDURE — 82962 GLUCOSE BLOOD TEST: CPT

## 2022-08-10 PROCEDURE — 97116 GAIT TRAINING THERAPY: CPT

## 2022-08-10 PROCEDURE — 63710000001 INSULIN LISPRO (HUMAN) PER 5 UNITS: Performed by: PHYSICIAN ASSISTANT

## 2022-08-10 PROCEDURE — 97535 SELF CARE MNGMENT TRAINING: CPT

## 2022-08-10 PROCEDURE — C9803 HOPD COVID-19 SPEC COLLECT: HCPCS | Performed by: PHYSICIAN ASSISTANT

## 2022-08-10 PROCEDURE — 63710000001 INSULIN DETEMIR PER 5 UNITS: Performed by: PHYSICIAN ASSISTANT

## 2022-08-10 RX ORDER — GLIPIZIDE 5 MG/1
5 TABLET ORAL
Status: DISCONTINUED | OUTPATIENT
Start: 2022-08-10 | End: 2022-08-12 | Stop reason: HOSPADM

## 2022-08-10 RX ORDER — INSULIN LISPRO 100 [IU]/ML
0-7 INJECTION, SOLUTION INTRAVENOUS; SUBCUTANEOUS
Status: DISCONTINUED | OUTPATIENT
Start: 2022-08-10 | End: 2022-08-12 | Stop reason: HOSPADM

## 2022-08-10 RX ADMIN — HYDROCODONE BITARTRATE AND ACETAMINOPHEN 1 TABLET: 5; 325 TABLET ORAL at 17:31

## 2022-08-10 RX ADMIN — INSULIN LISPRO 5 UNITS: 100 INJECTION, SOLUTION INTRAVENOUS; SUBCUTANEOUS at 12:16

## 2022-08-10 RX ADMIN — Medication 10 ML: at 21:43

## 2022-08-10 RX ADMIN — SENNOSIDES AND DOCUSATE SODIUM 2 TABLET: 50; 8.6 TABLET ORAL at 21:38

## 2022-08-10 RX ADMIN — GLIPIZIDE 5 MG: 5 TABLET ORAL at 08:37

## 2022-08-10 RX ADMIN — METFORMIN HYDROCHLORIDE 1000 MG: 1000 TABLET ORAL at 08:36

## 2022-08-10 RX ADMIN — ATORVASTATIN CALCIUM 40 MG: 40 TABLET, FILM COATED ORAL at 21:38

## 2022-08-10 RX ADMIN — INSULIN LISPRO 2 UNITS: 100 INJECTION, SOLUTION INTRAVENOUS; SUBCUTANEOUS at 17:21

## 2022-08-10 RX ADMIN — AZELASTINE HYDROCHLORIDE 1 SPRAY: 137 SPRAY, METERED NASAL at 08:37

## 2022-08-10 RX ADMIN — SENNOSIDES AND DOCUSATE SODIUM 2 TABLET: 50; 8.6 TABLET ORAL at 08:36

## 2022-08-10 RX ADMIN — INSULIN LISPRO 5 UNITS: 100 INJECTION, SOLUTION INTRAVENOUS; SUBCUTANEOUS at 08:37

## 2022-08-10 RX ADMIN — GLIPIZIDE 5 MG: 5 TABLET ORAL at 17:22

## 2022-08-10 RX ADMIN — METFORMIN HYDROCHLORIDE 1000 MG: 1000 TABLET ORAL at 17:22

## 2022-08-10 RX ADMIN — LEVOTHYROXINE SODIUM 100 MCG: 0.1 TABLET ORAL at 05:42

## 2022-08-10 RX ADMIN — INSULIN DETEMIR 30 UNITS: 100 INJECTION, SOLUTION SUBCUTANEOUS at 21:39

## 2022-08-10 NOTE — PROGRESS NOTES
[facility] Medicine Services  PROGRESS NOTE    Patient Name: Shira Rogers  : 1945  MRN: [id number]    Date of Admission: 2022  Primary Care Physician: Miguel Mazariegos MD    Subjective     CC: f/u L patella fracture, L distal humerus fracture, COVID (+)     HPI:  Sitting up in bed. Pain is well-controlled. Feels like her mobility is improving. Frustrated that the cafeteria is bringing her mostly carbohydrates. Was excited her sugar got below 250 but is now back above 300. Doesn't feel like she can get enough to eat without eating things she shouldn't. Repeat COVID test negative, hopes the test today is negative so her  can visit.     ROS:  Gen- No fevers, chills  CV- No chest pain, palpitations  Resp- No cough, dyspnea  GI- No N/V/D, abd pain    Objective     Vital Signs:   Temp:  [97.8 °F (36.6 °C)-98.2 °F (36.8 °C)] 97.9 °F (36.6 °C)  Heart Rate:  [] 90  Resp:  [14-18] 18  BP: (107-127)/(66-78) 115/70     Physical Exam:  Constitutional: No acute distress, awake, alert and conversant. Sitting upright in bed   HENT: NCAT, mucous membranes moist  Respiratory: Clear to auscultation bilaterally, respiratory effort normal on room air   Cardiovascular: RRR, no murmurs, rubs, or gallops  Gastrointestinal: Positive bowel sounds, soft, nontender, nondistended  Musculoskeletal: No bilateral ankle edema  Psychiatric: Appropriate affect, cooperative  Neurologic: Oriented x 3, moves all extremities spontaneously without focal deficits, speech clear  Skin: No rashes    Results Reviewed:  LAB RESULTS:      Lab 22  0807 22   WBC 7.78 10.66   HEMOGLOBIN 11.5* 12.1   HEMATOCRIT 33.7* 34.9   PLATELETS 159 174   NEUTROS ABS  --  7.95*   IMMATURE GRANS (ABS)  --  0.04   LYMPHS ABS  --  1.78   MONOS ABS  --  0.63   EOS ABS  --  0.22   MCV 85.3 85.5         Lab 22  0807 22   SODIUM 134* 135*   POTASSIUM 4.3 4.1   CHLORIDE 102 103   CO2 24.0 23.0    ANION GAP 8.0 9.0   BUN 14 23   CREATININE 0.76 0.70   EGFR 81.8 90.3   GLUCOSE 286* 294*   CALCIUM 8.5* 9.0   HEMOGLOBIN A1C  --  13.30*   TSH  --  13.200*         Lab 08/07/22  0827   TOTAL PROTEIN 6.7   ALBUMIN 4.00   GLOBULIN 2.7   ALT (SGPT) 15   AST (SGOT) 17   BILIRUBIN 0.3   ALK PHOS 91     Brief Urine Lab Results     None        Microbiology Results Abnormal     Procedure Component Value - Date/Time    COVID PRE-OP / PRE-PROCEDURE SCREENING ORDER (NO ISOLATION) - Swab, Nasopharynx [[id number]]  (Normal) Collected: 08/09/22 1157    Lab Status: Final result Specimen: Swab from Nasopharynx Updated: 08/09/22 1653    Narrative:      The following orders were created for panel order COVID PRE-OP / PRE-PROCEDURE SCREENING ORDER (NO ISOLATION) - Swab, Nasopharynx.  Procedure                               Abnormality         Status                     ---------                               -----------         ------                     COVID-19, APTIMA PANTHER...[[id number]]  Normal              Final result                 Please view results for these tests on the individual orders.    COVID-19, APTIMA PANTHER PREETI IN-HOUSE NP/OP SWAB IN UTM/VTM/SALINE TRANSPORT MEDIA 24HR TAT - Swab, Nasopharynx [[id number]]  (Normal) Collected: 08/09/22 1157    Lab Status: Final result Specimen: Swab from Nasopharynx Updated: 08/09/22 1653     COVID19 Not Detected    Narrative:      Fact sheet for providers: https://www.fda.gov/media/304239/download     Fact sheet for patients: https://www.fda.gov/media/905755/download    Test performed by RT PCR.        No radiology results from the last 24 hrs    I have reviewed the medications:  Scheduled Meds:atorvastatin, 40 mg, Oral, Nightly  azelastine, 1 spray, Each Nare, BID  glipizide, 5 mg, Oral, BID AC  insulin detemir, 25 Units, Subcutaneous, Nightly  insulin lispro, 0-7 Units, Subcutaneous, TID AC  levothyroxine, 100 mcg, Oral, Q AM  metFORMIN, 1,000 mg, Oral, BID With  Meals  senna-docusate sodium, 2 tablet, Oral, BID  sodium chloride, 10 mL, Intravenous, Q12H      Continuous Infusions:   PRN Meds:.senna-docusate sodium **AND** polyethylene glycol **AND** bisacodyl **AND** bisacodyl  •  dextrose  •  dextrose  •  glucagon (human recombinant)  •  HYDROcodone-acetaminophen  •  meclizine  •  ondansetron  •  [COMPLETED] Insert peripheral IV **AND** sodium chloride  •  sodium chloride    Assessment & Plan     Active Hospital Problems    Diagnosis  POA   • **Fall [W19.XXXA]  Yes   • Closed fracture of left elbow, initial encounter [S42.402A]  Yes   • Type 2 diabetes mellitus (HCC) [E11.9]  Yes   • Hypothyroidism [E03.9]  Yes   • Essential hypertension [I10]  Yes   • Left supracondylar humerus fracture [S42.412A]  Yes   • Closed fracture of left patella [S82.002A]  Yes      Resolved Hospital Problems   No resolved problems to display.     Brief Hospital Course to date:  Shira Rogers is a 77 y.o. female with with PMH significant for HTN, HLD, chronic intermittent dizziness, hypothyroidism and poorly-controlled insulin-dependent DMII. She was admitted to [facility] 8/7/22 for L patella and L distal humerus fracture secondary to a mechanical fall at home     Fall   Acute L patellar fracture  Acute L distal humerus fracture  - Mechanism of fall unknown, likely mechanical  - L knee XR showed acute minimally displaced fracture of patella   - L elbow XR showed acute supracondylar fracture of distal humerus   - Appreciate orthopedic surgery evaluation - recommend non-operative management for now  - NWB to LUE. WBAT to LLE with knee immobilizer   - Continue PRN pain control  - PT/OT following - recommend rehab    False-positive COVID-19 test  - Incidentally tested positive on admission screening test  - Patient is vaccinated and boosted  - Repeat test negative x 2 - will DC isolation precautions today    Poorly-controlled insulin-dependent DMII    - Patient notes recent dietary  indiscretion and poor medication compliance. She reports she was recently lectured by her PCP for uncontrolled DM and A1c 13.3%  - On Toujeo 34 units QHS, Glimepiride 2mg daily and Metformin 1000mg BID  - Increase Levemir to 30 units QHS, increase Glipizide to 5mg BID and Meftormin 1000mg BID, add back SSI     Hyperlipidemia  - Continue statin     Hypothyroidism  - TSH elevated at 13.2, free T4 0.90 - question compliance given   - Synthroid increased to 100mcg this admission. Will need repeat TSH in 4-6 weeks     Hypertension  - BP currently stable, not on any meds    Expected Discharge Location and Transportation: rehab  Expected Discharge Date: 8/12/22 if bed available     DVT prophylaxis:Mechanical DVT prophylaxis orders are present.     AM-PAC 6 Clicks Score (PT): 13 (08/09/22 2000)    CODE STATUS:   Code Status and Medical Interventions:   Ordered at: 08/07/22 1113     Level Of Support Discussed With:    Patient     Code Status (Patient has no pulse and is not breathing):    CPR (Attempt to Resuscitate)     Medical Interventions (Patient has pulse or is breathing):    Full Support     Aleida Justin PA-C  08/10/22

## 2022-08-10 NOTE — PLAN OF CARE
Goal Outcome Evaluation:  Plan of Care Reviewed With: patient        Progress: improving  Outcome Evaluation: Pt continues to make small improvements towards goals. Advanced to EOB with modAx2 and transfers sit to stand with modAx2. Pt Marly with use of quad cane to take steps. Pt modA for all hair care this date. Completed AAROM x10 reps of L shoulder. Continue IP OT per POC.

## 2022-08-10 NOTE — THERAPY TREATMENT NOTE
Patient Name: Shira Rogers  : 1945    MRN: [id number]                              Today's Date: 8/10/2022       Admit Date: 2022    Visit Dx:     ICD-10-CM ICD-9-CM   1. Closed fracture of left elbow, initial encounter  S42.402A 812.40   2. Closed nondisplaced transverse fracture of left patella, initial encounter  S82.035A 822.0     Patient Active Problem List   Diagnosis   • Fall   • Type 2 diabetes mellitus (HCC)   • Hypothyroidism   • Essential hypertension   • Left supracondylar humerus fracture   • Closed fracture of left patella   • Closed fracture of left elbow, initial encounter     Past Medical History:   Diagnosis Date   • Arthritis    • Diabetes mellitus (HCC)    • Disease of thyroid gland    • Elevated cholesterol      Past Surgical History:   Procedure Laterality Date   • APPENDECTOMY     • HYSTERECTOMY     • TONSILLECTOMY        General Information     Row Name 08/10/22 1124          OT Time and Intention    Document Type therapy note (daily note)  -HK     Mode of Treatment occupational therapy  -     Row Name 08/10/22 1124          General Information    Patient Profile Reviewed yes  -HK     Existing Precautions/Restrictions fall;other (see comments);brace on at all times   Airborne precautions; NWB L UE can WB through platform if needed; sling on when up; WBAT L LE with KI on at all times.  -HK     Barriers to Rehab medically complex  -     Row Name 08/10/22 1124          Cognition    Orientation Status (Cognition) oriented x 4  -     Row Name 08/10/22 1124          Safety Issues, Functional Mobility    Safety Issues Affecting Function (Mobility) safety precautions follow-through/compliance;safety precaution awareness;insight into deficits/self-awareness;awareness of need for assistance;judgment;problem-solving;sequencing abilities  -HK     Impairments Affecting Function (Mobility) balance;endurance/activity tolerance;pain;range of motion (ROM);strength  -HK           User Key   (r) = Recorded By, (t) = Taken By, (c) = Cosigned By    Initials Name Provider Type     Leila Sal, OT Occupational Therapist                 Mobility/ADL's     Row Name 08/10/22 1132          Bed Mobility    Bed Mobility scooting/bridging;supine-sit  -HK     Scooting/Bridging [place] (Bed Mobility) moderate assist (50% patient effort);2 person assist;verbal cues  -     Supine-Sit [place] (Bed Mobility) moderate assist (50% patient effort);2 person assist;verbal cues  -     Bed Mobility, Safety Issues decreased use of arms for pushing/pulling;impaired trunk control for bed mobility  -     Assistive Device (Bed Mobility) draw sheet;bed rails  -     Row Name 08/10/22 1132          Transfers    Transfers sit-stand transfer  -     Sit-Stand [place] (Transfers) moderate assist (50% patient effort);2 person assist;verbal cues  -     Row Name 08/10/22 1132          Sit-Stand Transfer    Assistive Device (Sit-Stand Transfers) cane, quad  -     Row Name 08/10/22 1132          Functional Mobility    Functional Mobility- Comment deferred to PT  -[facility] Name 08/10/22 1132          Activities of Daily Living    BADL Assessment/Intervention grooming  -     Row Name 08/10/22 1132          Mobility    Extremity Weight-bearing Status left upper extremity;left lower extremity  -HK     Left Upper Extremity (Weight-bearing Status) non weight-bearing (NWB)  -     Left Lower Extremity (Weight-bearing Status) weight-bearing as tolerated (WBAT);other (see comments)  WBAT with KI on at all times  -     Row Name 08/10/22 1132          Upper Body Dressing Assessment/Training    [place] Level (Upper Body Dressing) maximum assist (25% patient effort)  -     Position (Upper Body Dressing) edge of bed sitting  -     Comment, (Upper Body Dressing) Reviewed all erinn dressing and sling mangement as well as wear and care. Educated on NWB status. maxA for sling management.  -     Row Name 08/10/22  1132          Grooming Assessment/Training    [facility] Level (Grooming) hair care, combing/brushing;moderate assist (50% patient effort)  -     Position (Grooming) edge of bed sitting  -     Comment, (Grooming) ModA due to excessive knotting.  -           User Key  (r) = Recorded By, (t) = Taken By, (c) = Cosigned By    Initials Name Provider Type     Liela Sal, OT Occupational Therapist               Obj/Interventions     [facility] Name 08/10/22 1136          Sensory Assessment (Somatosensory)    Sensory Assessment (Somatosensory) sensation intact  -[facility] Name 08/10/22 1136          Shoulder (Therapeutic Exercise)    Shoulder (Therapeutic Exercise) AAROM (active assistive range of motion)  -     Shoulder AAROM (Therapeutic Exercise) left;right assist left;flexion;extension;10 repetitions  -[facility] Name 08/10/22 1136          Motor Skills    Therapeutic Exercise shoulder  -[facility] Name 08/10/22 1136          Balance    Balance Assessment sitting static balance;sitting dynamic balance;standing static balance;standing dynamic balance  -     Static Sitting Balance supervision;verbal cues  -     Dynamic Sitting Balance supervision;verbal cues  -HK     Position, Sitting Balance unsupported;sitting edge of bed  -HK     Static Standing Balance contact guard  -     Dynamic Standing Balance minimal assist  -HK     Position/Device Used, Standing Balance supported;cane, quad  -HK     Balance Interventions sitting;occupation based/functional task  -           User Key  (r) = Recorded By, (t) = Taken By, (c) = Cosigned By    Initials Name Provider Type     Leila Sal, OT Occupational Therapist               Goals/Plan    No documentation.                Clinical Impression     [facility] Name 08/10/22 1137          Pain Assessment    Additional Documentation Pain Scale: FACES Pre/Post-Treatment (Group)  -[facility] Name 08/10/22 1137          Pain Scale: FACES Pre/Post-Treatment    Pain: FACES Scale,  Pretreatment 2-->hurts little bit  -HK     Posttreatment Pain Rating 2-->hurts little bit  -HK     Pain Location - Side/Orientation Left  -HK     Pain Location generalized  -HK     Row Name 08/10/22 1137          Plan of Care Review    Plan of Care Reviewed With patient  -HK     Progress improving  -HK     Outcome Evaluation Pt continues to make small improvements towards goals. Advanced to EOB with modAx2 and transfers sit to stand with modAx2. Pt Marly with use of quad cane to take steps. Pt modA for all hair care this date. Completed AAROM x10 reps of L shoulder. Continue IP OT per POC.  -HK     Row Name 08/10/22 1137          Therapy Assessment/Plan (OT)    Rehab Potential (OT) good, to achieve stated therapy goals  -HK     Criteria for Skilled Therapeutic Interventions Met (OT) yes;skilled treatment is necessary  -HK     Therapy Frequency (OT) daily  -HK     Row Name 08/10/22 1137          Therapy Plan Review/Discharge Plan (OT)    Anticipated Discharge Disposition (OT) inpatient rehabilitation facility  -     Row Name 08/10/22 1137          Vital Signs    Pre Systolic BP Rehab --  RN cleared for tx; VSS  -HK     O2 Delivery Pre Treatment room air  -HK     O2 Delivery Intra Treatment room air  -HK     O2 Delivery Post Treatment room air  -HK     Pre Patient Position Supine  -HK     Intra Patient Position Standing  -HK     Post Patient Position Sitting  -HK     Row Name 08/10/22 1137          Positioning and Restraints    Pre-Treatment Position in bed  -HK     Post Treatment Position chair  -HK     In Chair notified nsg;reclined;call light within reach;encouraged to call for assist;exit alarm on  -HK           User Key  (r) = Recorded By, (t) = Taken By, (c) = Cosigned By    Initials Name Provider Type     Leila Sal, OT Occupational Therapist               Outcome Measures     Row Name 08/10/22 1137          How much help from another is currently needed...    Putting on and taking off regular lower body  clothing? 1  -HK     Bathing (including washing, rinsing, and drying) 1  -HK     Toileting (which includes using toilet bed pan or urinal) 1  -HK     Putting on and taking off regular upper body clothing 2  -HK     Taking care of personal grooming (such as brushing teeth) 3  -HK     Eating meals 3  -HK     AM-PAC 6 Clicks Score (OT) 11  -HK     Row Name 08/10/22 1139          Functional Assessment    Outcome Measure Options AM-PAC 6 Clicks Daily Activity (OT)  -HK           User Key  (r) = Recorded By, (t) = Taken By, (c) = Cosigned By    Initials Name Provider Type    HK Leila Sal, OT Occupational Therapist                Occupational Therapy Education                 Title: PT OT SLP Therapies (Done)     Topic: Occupational Therapy (Done)     Point: ADL training (Done)     Description:   Instruct learner(s) on proper safety adaptation and remediation techniques during self care or transfers.   Instruct in proper use of assistive devices.              Learning Progress Summary           Patient Acceptance, E,TB,D, VU,NR by  at 8/10/2022 1141    Eager, E,TB,D, VU,NR by AR at 8/8/2022 1202                   Point: Home exercise program (Done)     Description:   Instruct learner(s) on appropriate technique for monitoring, assisting and/or progressing therapeutic exercises/activities.              Learning Progress Summary           Patient Acceptance, E,TB,D, VU,NR by  at 8/10/2022 1141    Eager, E,TB,D, VU,NR by AR at 8/8/2022 1202                   Point: Precautions (Done)     Description:   Instruct learner(s) on prescribed precautions during self-care and functional transfers.              Learning Progress Summary           Patient Acceptance, E,TB,D, VU,NR by  at 8/10/2022 1141    Eager, E,TB,D, VU,NR by AR at 8/8/2022 1202                   Point: Body mechanics (Done)     Description:   Instruct learner(s) on proper positioning and spine alignment during self-care, functional mobility activities  and/or exercises.              Learning Progress Summary           Patient Acceptance, E,TB,D, VU,NR by  at 8/10/2022 1141    Eager, E,TB,D, VU,NR by AR at 8/8/2022 1202                               User Key     Initials Effective Dates Name Provider Type Discipline    AR 06/16/21 -  Caprice Tucker OT Occupational Therapist OT     06/16/21 -  Leila Sal OT Occupational Therapist OT              OT Recommendation and Plan  Therapy Frequency (OT): daily  Plan of Care Review  Plan of Care Reviewed With: patient  Progress: improving  Outcome Evaluation: Pt continues to make small improvements towards goals. Advanced to EOB with modAx2 and transfers sit to stand with modAx2. Pt Marly with use of quad cane to take steps. Pt modA for all hair care this date. Completed AAROM x10 reps of L shoulder. Continue IP OT per POC.     Time Calculation:    Time Calculation- OT     Row Name 08/10/22 1030             Time Calculation- OT    OT Start Time 1030  -      OT Received On 08/10/22  -      OT Goal Re-Cert Due Date 08/20/22  -              Timed Charges    60660 - OT Self Care/Mgmt Minutes 23  -HK              Total Minutes    Timed Charges Total Minutes 23  -HK       Total Minutes 23  -HK            User Key  (r) = Recorded By, (t) = Taken By, (c) = Cosigned By    Initials Name Provider Type     Leila Sal OT Occupational Therapist              Therapy Charges for Today     Code Description Service Date Service Provider Modifiers Qty    06007649892 HC OT SELF CARE/MGMT/TRAIN EA 15 MIN 8/10/2022 Leila Sal OT GO 2               Leila Sal OT  8/10/2022

## 2022-08-10 NOTE — PLAN OF CARE
Problem: Fall Injury Risk  Goal: Absence of Fall and Fall-Related Injury  Outcome: Ongoing, Progressing     Problem: Skin Injury Risk Increased  Goal: Skin Health and Integrity  Outcome: Ongoing, Progressing   Goal Outcome Evaluation:

## 2022-08-10 NOTE — CASE MANAGEMENT/SOCIAL WORK
Continued Stay Note  [facility]     Patient Name: Shira Rogers  MRN: [id number]  Today's Date: 8/10/2022    Admit Date: 8/7/2022     Discharge Plan     Row Name 08/10/22 1454       Plan    Plan inpt rehab    Plan Comments Case mgt f/u. chart reviewed. I met with Mrs Rogers to discuss d/c options. Discussed need to inpt rehab until she can manage at home with assist of her . She is agreeable and requested a referral to [facility]. I spoke with April at [facility] who accepted referral. Case mgt will follow and assist with final plan               Discharge Codes    No documentation.               Expected Discharge Date and Time     Expected Discharge Date Expected Discharge Time    Aug 12, 2022             Sonja C Kellerman, RN

## 2022-08-10 NOTE — PLAN OF CARE
Goal Outcome Evaluation:  Plan of Care Reviewed With: patient has a good appetite. R/A no difficulty breathing no cough. Sitting up in bed.        Progress: improving

## 2022-08-10 NOTE — PLAN OF CARE
Goal Outcome Evaluation:  Plan of Care Reviewed With: patient        Progress: improving  Outcome Evaluation: Pt increased distance ambulated to 40' with Min Ax2 progressing to CGA with quad cane. KI donned throughout entire session. Activity limited by fatigue. Continue to recommend d/c to IRF when medically appropriate.   increased physiological demand for nutrient with breastfeeding

## 2022-08-10 NOTE — THERAPY TREATMENT NOTE
Patient Name: Shira Rogers  : 1945    MRN: [id number]                              Today's Date: 8/10/2022       Admit Date: 2022    Visit Dx:     ICD-10-CM ICD-9-CM   1. Closed fracture of left elbow, initial encounter  S42.402A 812.40   2. Closed nondisplaced transverse fracture of left patella, initial encounter  S82.035A 822.0     Patient Active Problem List   Diagnosis   • Fall   • Type 2 diabetes mellitus (HCC)   • Hypothyroidism   • Essential hypertension   • Left supracondylar humerus fracture   • Closed fracture of left patella   • Closed fracture of left elbow, initial encounter     Past Medical History:   Diagnosis Date   • Arthritis    • Diabetes mellitus (HCC)    • Disease of thyroid gland    • Elevated cholesterol      Past Surgical History:   Procedure Laterality Date   • APPENDECTOMY     • HYSTERECTOMY     • TONSILLECTOMY        General Information     Row Name 08/10/22 173          Physical Therapy Time and Intention    Document Type therapy note (daily note)  -     Mode of Treatment physical therapy  -     Row Name 08/10/22 173          General Information    Patient Profile Reviewed yes  -     Existing Precautions/Restrictions fall;other (see comments);brace on at all times   Airborne precautions; NWB L UE can WB through platform if needed; sling on when up; WBAT L LE with KI on at all times.  -     Row Name 08/10/22 1735          Cognition    Orientation Status (Cognition) oriented x 4  -     Row Name 08/10/22 173          Safety Issues, Functional Mobility    Impairments Affecting Function (Mobility) balance;endurance/activity tolerance;pain;range of motion (ROM);strength  -           User Key  (r) = Recorded By, (t) = Taken By, (c) = Cosigned By    Initials Name Provider Type     Leila Roman PT Physical Therapist               Mobility     Row Name 08/10/22 1735          Bed Mobility    Supine-Sit [place] (Bed Mobility) moderate assist (50% patient  effort);2 person assist;verbal cues  -HP     Sit-Supine [place] (Bed Mobility) moderate assist (50% patient effort);2 person assist  -HP     Assistive Device (Bed Mobility) draw sheet;bed rails  -     Comment, (Bed Mobility) Mod A for trunk and BLE management. VC for sequencing.. Good ability to maintain NWB on LUE  -HP     Row Name 08/10/22 1737          Transfers    Comment, (Transfers) Pt performed STS with Mod A x2 and quad cane. VC for sequencing.  -HP     Row Name 08/10/22 1735          Sit-Stand Transfer    Sit-Stand [place] (Transfers) moderate assist (50% patient effort);2 person assist;verbal cues  -HP     Assistive Device (Sit-Stand Transfers) cane, quad  -HP     Row Name 08/10/22 9479          Gait/Stairs (Locomotion)    [place] Level (Gait) verbal cues;nonverbal cues (demo/gesture);minimum assist (75% patient effort);2 person assist;1 person to manage equipment;contact guard  -     Assistive Device (Gait) cane, quad  -HP     Distance in Feet (Gait) 40  -HP     Deviations/Abnormal Patterns (Gait) bilateral deviations;antalgic;kevin decreased;gait speed decreased;stride length decreased;weight shifting decreased  -     Bilateral Gait Deviations forward flexed posture;decreased arm swing  -     Comment, (Gait/Stairs) Pt amb 40' with Min A x2 progressing to CGA with quad cane. VC for sequencing and increased weight acceptance on LLE. KI donned at all times. Pt demonstrated forward flexed posture, wide MARIE, and step-to gait pattern. Gait deviations improved with repetition and VC. Activity limited by fatigue.  -           User Key  (r) = Recorded By, (t) = Taken By, (c) = Cosigned By    Initials Name Provider Type     Leila Roman PT Physical Therapist               Obj/Interventions     Row Name 08/10/22 6692          Balance    Balance Assessment sitting static balance;sitting dynamic balance;sit to stand dynamic balance;standing static balance;standing dynamic balance  -      Static Sitting Balance standby assist  -     Dynamic Sitting Balance standby assist  -HP     Position, Sitting Balance sitting edge of bed  -     Static Standing Balance contact guard  -HP     Dynamic Standing Balance minimal assist  -HP     Position/Device Used, Standing Balance supported;walker, rolling  -     Balance Interventions sitting;standing;sit to stand;occupation based/functional task  -           User Key  (r) = Recorded By, (t) = Taken By, (c) = Cosigned By    Initials Name Provider Type     Leila Roman, PT Physical Therapist               Goals/Plan    No documentation.                Clinical Impression     Row Name 08/10/22 1740          Pain    Pretreatment Pain Rating 2/10  -HP     Posttreatment Pain Rating 2/10  -HP     Row Name 08/10/22 1740          Plan of Care Review    Plan of Care Reviewed With patient  -     Progress improving  -     Outcome Evaluation Pt increased distance ambulated to 40' with Min Ax2 progressing to CGA with quad cane. KI donned throughout entire session. Activity limited by fatigue. Continue to recommend d/c to IRF when medically appropriate.  -     Row Name 08/10/22 1740          Vital Signs    Pre Systolic BP Rehab --  VSS  -     Pre Patient Position Supine  -     Intra Patient Position Standing  -HP     Post Patient Position Sitting  -HP     Row Name 08/10/22 1740          Positioning and Restraints    Pre-Treatment Position in bed  -HP     Post Treatment Position chair  -HP     In Chair notified nsg;reclined;sitting;call light within reach;encouraged to call for assist;exit alarm on;with brace;legs elevated  -           User Key  (r) = Recorded By, (t) = Taken By, (c) = Cosigned By    Initials Name Provider Type     Leila Roman, MEI Physical Therapist               Outcome Measures     Row Name 08/10/22 4878          How much help from another person do you currently need...    Turning from your back to your side while in flat bed  without using bedrails? 3  -HP     Moving from lying on back to sitting on the side of a flat bed without bedrails? 2  -HP     Moving to and from a bed to a chair (including a wheelchair)? 2  -HP     Standing up from a chair using your arms (e.g., wheelchair, bedside chair)? 2  -HP     Climbing 3-5 steps with a railing? 1  -HP     To walk in hospital room? 2  -HP     AM-PAC 6 Clicks Score (PT) 12  -HP     Highest level of mobility 4 --> Transferred to chair/commode  -HP     Row Name 08/10/22 1827 08/10/22 1139       Functional Assessment    Outcome Measure Options AM-PAC 6 Clicks Basic Mobility (PT)  -HP AM-PAC 6 Clicks Daily Activity (OT)  -          User Key  (r) = Recorded By, (t) = Taken By, (c) = Cosigned By    Initials Name Provider Type    Leila Rodriguez, OT Occupational Therapist    HP Leila Roman, PT Physical Therapist                             Physical Therapy Education                 Title: PT OT SLP Therapies (Done)     Topic: Physical Therapy (Done)     Point: Mobility training (Done)     Learning Progress Summary           Patient Acceptance, E,D, VU,NR by  at 8/10/2022 1828    Acceptance, E, VU by FW at 8/9/2022 1633    Acceptance, E, VU by FW at 8/8/2022 1121                   Point: Home exercise program (Done)     Learning Progress Summary           Patient Acceptance, E,D, VU,NR by HP at 8/10/2022 1828    Acceptance, E, VU by FW at 8/9/2022 1633                   Point: Body mechanics (Done)     Learning Progress Summary           Patient Acceptance, E,D, VU,NR by HP at 8/10/2022 1828    Acceptance, E, VU by FW at 8/9/2022 1633    Acceptance, E, VU by FW at 8/8/2022 1121                   Point: Precautions (Done)     Learning Progress Summary           Patient Acceptance, E,D, VU,NR by  at 8/10/2022 1828    Acceptance, E, VU by FW at 8/9/2022 1633    Acceptance, E, VU by FW at 8/8/2022 1121                               User Key     Initials Effective Dates Name Provider Type  Discipline    FW 05/05/22 -  Naveen Silva, MEI Physical Therapist PT     06/01/21 -  Leila Roman PT Physical Therapist PT              PT Recommendation and Plan     Plan of Care Reviewed With: patient  Progress: improving  Outcome Evaluation: Pt increased distance ambulated to 40' with Min Ax2 progressing to CGA with quad cane. KI donned throughout entire session. Activity limited by fatigue. Continue to recommend d/c to IRF when medically appropriate.     Time Calculation:    PT Charges     Row Name 08/10/22 1100             Time Calculation    Start Time 1100  -HP      PT Received On 08/10/22  -              Timed Charges    66921 - Gait Training Minutes  25  -HP      03992 - PT Therapeutic Activity Minutes --  -HP              Total Minutes    Timed Charges Total Minutes 25  -HP       Total Minutes 25  -HP            User Key  (r) = Recorded By, (t) = Taken By, (c) = Cosigned By    Initials Name Provider Type     Leila Roman, MEI Physical Therapist              Therapy Charges for Today     Code Description Service Date Service Provider Modifiers Qty    27563664717 HC GAIT TRAINING EA 15 MIN 8/10/2022 Leila Roman, PT GP 2          PT G-Codes  Outcome Measure Options: AM-PAC 6 Clicks Basic Mobility (PT)  AM-PAC 6 Clicks Score (PT): 12  AM-PAC 6 Clicks Score (OT): 11    Leila Roman PT  8/10/2022

## 2022-08-11 LAB
GLUCOSE BLDC GLUCOMTR-MCNC: 106 MG/DL (ref 70–130)
GLUCOSE BLDC GLUCOMTR-MCNC: 113 MG/DL (ref 70–130)
GLUCOSE BLDC GLUCOMTR-MCNC: 157 MG/DL (ref 70–130)
GLUCOSE BLDC GLUCOMTR-MCNC: 178 MG/DL (ref 70–130)

## 2022-08-11 PROCEDURE — 97116 GAIT TRAINING THERAPY: CPT

## 2022-08-11 PROCEDURE — 97530 THERAPEUTIC ACTIVITIES: CPT

## 2022-08-11 PROCEDURE — 82962 GLUCOSE BLOOD TEST: CPT

## 2022-08-11 PROCEDURE — 63710000001 INSULIN DETEMIR PER 5 UNITS: Performed by: PHYSICIAN ASSISTANT

## 2022-08-11 PROCEDURE — 99232 SBSQ HOSP IP/OBS MODERATE 35: CPT | Performed by: PHYSICIAN ASSISTANT

## 2022-08-11 PROCEDURE — 63710000001 INSULIN LISPRO (HUMAN) PER 5 UNITS: Performed by: PHYSICIAN ASSISTANT

## 2022-08-11 RX ADMIN — GLIPIZIDE 5 MG: 5 TABLET ORAL at 16:57

## 2022-08-11 RX ADMIN — INSULIN LISPRO 2 UNITS: 100 INJECTION, SOLUTION INTRAVENOUS; SUBCUTANEOUS at 12:00

## 2022-08-11 RX ADMIN — INSULIN DETEMIR 24 UNITS: 100 INJECTION, SOLUTION SUBCUTANEOUS at 20:40

## 2022-08-11 RX ADMIN — Medication 10 ML: at 20:23

## 2022-08-11 RX ADMIN — LEVOTHYROXINE SODIUM 100 MCG: 0.1 TABLET ORAL at 06:07

## 2022-08-11 RX ADMIN — GLIPIZIDE 5 MG: 5 TABLET ORAL at 08:21

## 2022-08-11 RX ADMIN — ATORVASTATIN CALCIUM 40 MG: 40 TABLET, FILM COATED ORAL at 20:24

## 2022-08-11 RX ADMIN — SENNOSIDES AND DOCUSATE SODIUM 2 TABLET: 50; 8.6 TABLET ORAL at 08:21

## 2022-08-11 RX ADMIN — METFORMIN HYDROCHLORIDE 1000 MG: 1000 TABLET ORAL at 08:21

## 2022-08-11 RX ADMIN — Medication 10 ML: at 08:21

## 2022-08-11 RX ADMIN — METFORMIN HYDROCHLORIDE 1000 MG: 1000 TABLET ORAL at 16:56

## 2022-08-11 RX ADMIN — AZELASTINE HYDROCHLORIDE 1 SPRAY: 137 SPRAY, METERED NASAL at 08:20

## 2022-08-11 NOTE — PLAN OF CARE
Goal Outcome Evaluation:  Plan of Care Reviewed With: patient on R/A appetite has increased. Pt stated feeling better today. No c/o at this time. PT is working with pt.         Progress: improving

## 2022-08-11 NOTE — THERAPY TREATMENT NOTE
Patient Name: Shira Rogers  : 1945    MRN: [id number]                              Today's Date: 2022       Admit Date: 2022    Visit Dx:     ICD-10-CM ICD-9-CM   1. Closed fracture of left elbow, initial encounter  S42.402A 812.40   2. Closed nondisplaced transverse fracture of left patella, initial encounter  S82.035A 822.0     Patient Active Problem List   Diagnosis   • Fall   • Type 2 diabetes mellitus (HCC)   • Hypothyroidism   • Essential hypertension   • Left supracondylar humerus fracture   • Closed fracture of left patella   • Closed fracture of left elbow, initial encounter     Past Medical History:   Diagnosis Date   • Arthritis    • Diabetes mellitus (HCC)    • Disease of thyroid gland    • Elevated cholesterol      Past Surgical History:   Procedure Laterality Date   • APPENDECTOMY     • HYSTERECTOMY     • TONSILLECTOMY        General Information     Row Name 22 0953          Physical Therapy Time and Intention    Document Type therapy note (daily note)  -     Mode of Treatment physical therapy  -     Row Name 22 0953          General Information    Patient Profile Reviewed yes  -     Existing Precautions/Restrictions fall;other (see comments);brace on at all times   Airborne precautions; NWB L UE can WB through platform if needed; sling on when up; WBAT L LE with KI on at all times.  -     Row Name 22 0953          Cognition    Orientation Status (Cognition) oriented x 4  -     Row Name 22 0953          Safety Issues, Functional Mobility    Impairments Affecting Function (Mobility) balance;endurance/activity tolerance;pain;range of motion (ROM);strength  -           User Key  (r) = Recorded By, (t) = Taken By, (c) = Cosigned By    Initials Name Provider Type     Leila Roman PT Physical Therapist               Mobility     Row Name 22 0958          Bed Mobility    Bed Mobility scooting/bridging;supine-sit  -     Scooting/Bridging  Kern (Bed Mobility) standby assist  -     Supine-Sit Kern (Bed Mobility) moderate assist (50% patient effort);2 person assist;verbal cues  -     Comment, (Bed Mobility) Mod A for trunk management. Difficulty managing LLE to EOB. VC for sequencing  -     Row Name 08/11/22 0958          Transfers    Comment, (Transfers) VC for hand Placement  -     Row Name 08/11/22 0958          Sit-Stand Transfer    Sit-Stand Kern (Transfers) moderate assist (50% patient effort);verbal cues  -     Assistive Device (Sit-Stand Transfers) cane, quad  -     Row Name 08/11/22 0958          Gait/Stairs (Locomotion)    Kern Level (Gait) nonverbal cues (demo/gesture);verbal cues;contact guard;1 person assist;minimum assist (75% patient effort)  -     Assistive Device (Gait) cane, quad  -     Distance in Feet (Gait) 60  -     Deviations/Abnormal Patterns (Gait) bilateral deviations;antalgic;kevin decreased;gait speed decreased;stride length decreased;weight shifting decreased  -     Bilateral Gait Deviations forward flexed posture;decreased arm swing  -     Comment, (Gait/Stairs) Pt amb 60' with quad cane and Min A quickly progressing to CGA. VC for sequencing. Pt demonstrated foward flexed posture and wide MARIE. increased time and effort to complete task. Mild pain reported. Activity limited by fatigue.  -     Row Name 08/11/22 0958          Mobility    Extremity Weight-bearing Status left upper extremity;left lower extremity  -     Left Upper Extremity (Weight-bearing Status) non weight-bearing (NWB)  -     Left Lower Extremity (Weight-bearing Status) weight-bearing as tolerated (WBAT);other (see comments)  WBAT with KI on at all times  -           User Key  (r) = Recorded By, (t) = Taken By, (c) = Cosigned By    Initials Name Provider Type     Leila Roman PT Physical Therapist               Obj/Interventions     Row Name 08/11/22 1002          Balance    Balance  Assessment sitting static balance;sitting dynamic balance;sit to stand dynamic balance;standing static balance;standing dynamic balance  -     Static Sitting Balance standby assist  -     Dynamic Sitting Balance standby assist  -HP     Position, Sitting Balance sitting edge of bed  -     Static Standing Balance contact guard  -HP     Dynamic Standing Balance contact guard  -     Balance Interventions sitting;standing;sit to stand;occupation based/functional task  -           User Key  (r) = Recorded By, (t) = Taken By, (c) = Cosigned By    Initials Name Provider Type     Leila Roman PT Physical Therapist               Goals/Plan    No documentation.                Clinical Impression     Row Name 08/11/22 1002          Pain    Pretreatment Pain Rating 2/10  -     Posttreatment Pain Rating 2/10  -     Pain Location - Side/Orientation Left  -     Pain Location anterior  -     Pain Location - knee  -     Pre/Posttreatment Pain Comment excessive swelling noted  -     Row Name 08/11/22 1002          Plan of Care Review    Plan of Care Reviewed With patient  -     Progress improving  -     Outcome Evaluation Pt demonstrated improvement with functional mobility. Pt performed bed mobility with Mod A. Pt performed STS with Mod A and quad cane. Pt amb 60' with CGA and quad cane. KI donned throughout session. Excessive knee swelling noted. Ice applied and leg elevated. Activity limited by fatigue. Continue to recommend IRF at this time. Will monitor progress closely. Plan to trial steps next session.  -     Row Name 08/11/22 1002          Vital Signs    Pre Systolic BP Rehab --  VSS  -HP     Pre Patient Position Supine  -     Intra Patient Position Standing  -HP     Post Patient Position Sitting  -     Row Name 08/11/22 1002          Positioning and Restraints    Pre-Treatment Position in bed  -HP     Post Treatment Position chair  -HP     In Chair notified nsg;reclined;sitting;call light  within reach;encouraged to call for assist;exit alarm on;legs elevated  -           User Key  (r) = Recorded By, (t) = Taken By, (c) = Cosigned By    Initials Name Provider Type    Leila Rae PT Physical Therapist               Outcome Measures     Row Name 08/11/22 1006          How much help from another person do you currently need...    Turning from your back to your side while in flat bed without using bedrails? 3  -HP     Moving from lying on back to sitting on the side of a flat bed without bedrails? 2  -HP     Moving to and from a bed to a chair (including a wheelchair)? 3  -HP     Standing up from a chair using your arms (e.g., wheelchair, bedside chair)? 3  -HP     Climbing 3-5 steps with a railing? 2  -HP     To walk in hospital room? 2  -HP     AM-PAC 6 Clicks Score (PT) 15  -HP     Highest level of mobility 4 --> Transferred to chair/commode  -     Row Name 08/11/22 1006          Functional Assessment    Outcome Measure Options AM-PAC 6 Clicks Basic Mobility (PT)  -           User Key  (r) = Recorded By, (t) = Taken By, (c) = Cosigned By    Initials Name Provider Type    Leila Rae PT Physical Therapist                             Physical Therapy Education                 Title: PT OT SLP Therapies (Done)     Topic: Physical Therapy (Done)     Point: Mobility training (Done)     Learning Progress Summary           Patient Acceptance, E,D, VU by  at 8/11/2022 1007    Acceptance, E,D, VU,NR by  at 8/10/2022 1828    Acceptance, E, VU by  at 8/9/2022 1633    Acceptance, E, VU by  at 8/8/2022 1121                   Point: Home exercise program (Done)     Learning Progress Summary           Patient Acceptance, E,D, VU by  at 8/11/2022 1007    Acceptance, E,D, VU,NR by  at 8/10/2022 1828    Acceptance, E, VU by  at 8/9/2022 1633                   Point: Body mechanics (Done)     Learning Progress Summary           Patient Acceptance, E,D, VU by  at 8/11/2022 1007     Acceptance, E,D, VU,NR by  at 8/10/2022 1828    Acceptance, E, VU by  at 8/9/2022 1633    Acceptance, E, VU by  at 8/8/2022 1121                   Point: Precautions (Done)     Learning Progress Summary           Patient Acceptance, E,D, VU by  at 8/11/2022 1007    Acceptance, E,D, VU,NR by  at 8/10/2022 1828    Acceptance, E, VU by  at 8/9/2022 1633    Acceptance, E, VU by  at 8/8/2022 1121                               User Key     Initials Effective Dates Name Provider Type Discipline     05/05/22 -  Naveen Silva, PT Physical Therapist PT     06/01/21 -  Leila Roman PT Physical Therapist PT              PT Recommendation and Plan     Plan of Care Reviewed With: patient  Progress: improving  Outcome Evaluation: Pt demonstrated improvement with functional mobility. Pt performed bed mobility with Mod A. Pt performed STS with Mod A and quad cane. Pt amb 60' with CGA and quad cane. KI donned throughout session. Excessive knee swelling noted. Ice applied and leg elevated. Activity limited by fatigue. Continue to recommend IRF at this time. Will monitor progress closely. Plan to trial steps next session.     Time Calculation:    PT Charges     Row Name 08/11/22 0900             Time Calculation    Start Time 0900  -HP              Timed Charges    46959 - Gait Training Minutes  30  -HP      78701 - PT Therapeutic Activity Minutes 10  -HP              Total Minutes    Timed Charges Total Minutes 40  -HP       Total Minutes 40  -HP            User Key  (r) = Recorded By, (t) = Taken By, (c) = Cosigned By    Initials Name Provider Type     Leila Roman, MEI Physical Therapist              Therapy Charges for Today     Code Description Service Date Service Provider Modifiers Qty    98240995718 HC GAIT TRAINING EA 15 MIN 8/10/2022 Leila Roman, PT GP 2    15567936953 HC GAIT TRAINING EA 15 MIN 8/11/2022 Leila Roman, PT GP 2    86258154850 HC PT THERAPEUTIC ACT EA 15 MIN 8/11/2022 Abel  Leila, PT GP 1          PT G-Codes  Outcome Measure Options: AM-PAC 6 Clicks Basic Mobility (PT)  AM-PAC 6 Clicks Score (PT): 15  AM-PAC 6 Clicks Score (OT): 11    Leila Roman, PT  8/11/2022

## 2022-08-11 NOTE — PLAN OF CARE
Goal Outcome Evaluation:  Plan of Care Reviewed With: patient        Progress: improving  Outcome Evaluation: Pt demonstrated improvement with functional mobility. Pt performed bed mobility with Mod A. Pt performed STS with Mod A and quad cane. Pt amb 60' with CGA and quad cane. KI donned throughout session. Excessive knee swelling noted. Ice applied and leg elevated. Activity limited by fatigue. Continue to recommend IRF at this time. Will monitor progress closely. Plan to trial steps next session.

## 2022-08-11 NOTE — PROGRESS NOTES
"    [facility] Medicine Services  PROGRESS NOTE    Patient Name: Shira Rogers  : 1945  MRN: [id number]    Date of Admission: 2022  Primary Care Physician: Miguel Mazariegos MD    Subjective     CC: f/u L patella fracture, L distal humerus fracture     HPI:  Sitting upright in bed, eating her breakfast. Reports that she could feel her blood sugar was \"low\" this morning before the tech checked it. She says she tried hard to not eat too many carbohydrates yesterday. She is very hungry today. States her pain was poorly-controlled last night, but she prefers to wait \"as long as possible\" between doses of PRN pain medication. Patient misses her  and is eager to see him now that she is out of isolation.     ROS:  Gen- No fevers, chills  CV- No chest pain, palpitations  Resp- No cough, dyspnea  GI- No N/V/D, abd pain    Objective     Vital Signs:   Temp:  [97.9 °F (36.6 °C)-98.4 °F (36.9 °C)] 98.1 °F (36.7 °C)  Heart Rate:  [90-96] 93  Resp:  [16-18] 16  BP: (106-141)/(67-76) 122/70     Physical Exam:  Constitutional: No acute distress, awake, alert and conversing appropriately. Sitting upright in bed   HENT: NCAT, mucous membranes moist  Respiratory: Clear to auscultation bilaterally, respiratory effort normal on room air   Cardiovascular: RRR, no murmurs, rubs, or gallops  Gastrointestinal: Positive bowel sounds, soft, nontender, nondistended  Musculoskeletal: No bilateral ankle edema; right knee with swelling and scab from fall  Psychiatric: Appropriate affect, cooperative  Neurologic: Oriented x 3, moves all extremities spontaneously without focal deficits, speech clear and spontaneous  Skin: No rashes    Results Reviewed:  LAB RESULTS:      Lab 22  0807 22  0827   WBC 7.78 10.66   HEMOGLOBIN 11.5* 12.1   HEMATOCRIT 33.7* 34.9   PLATELETS 159 174   NEUTROS ABS  --  7.95*   IMMATURE GRANS (ABS)  --  0.04   LYMPHS ABS  --  1.78   MONOS ABS  --  0.63   EOS ABS  --  " 0.22   MCV 85.3 85.5         Lab 08/08/22  0807 08/07/22  0827   SODIUM 134* 135*   POTASSIUM 4.3 4.1   CHLORIDE 102 103   CO2 24.0 23.0   ANION GAP 8.0 9.0   BUN 14 23   CREATININE 0.76 0.70   EGFR 81.8 90.3   GLUCOSE 286* 294*   CALCIUM 8.5* 9.0   HEMOGLOBIN A1C  --  13.30*   TSH  --  13.200*         Lab 08/07/22  0827   TOTAL PROTEIN 6.7   ALBUMIN 4.00   GLOBULIN 2.7   ALT (SGPT) 15   AST (SGOT) 17   BILIRUBIN 0.3   ALK PHOS 91     Brief Urine Lab Results     None        Microbiology Results Abnormal     Procedure Component Value - Date/Time    COVID PRE-OP / PRE-PROCEDURE SCREENING ORDER (NO ISOLATION) - Swab, Nasopharynx [[id number]]  (Normal) Collected: 08/09/22 1157    Lab Status: Final result Specimen: Swab from Nasopharynx Updated: 08/09/22 1653    Narrative:      The following orders were created for panel order COVID PRE-OP / PRE-PROCEDURE SCREENING ORDER (NO ISOLATION) - Swab, Nasopharynx.  Procedure                               Abnormality         Status                     ---------                               -----------         ------                     COVID-19, APTIMA PANTHER...[[id number]]  Normal              Final result                 Please view results for these tests on the individual orders.    COVID-19, APTIMA PANTHER PREETI IN-HOUSE NP/OP SWAB IN UTM/VTM/SALINE TRANSPORT MEDIA 24HR TAT - Swab, Nasopharynx [[id number]]  (Normal) Collected: 08/09/22 1157    Lab Status: Final result Specimen: Swab from Nasopharynx Updated: 08/09/22 1653     COVID19 Not Detected    Narrative:      Fact sheet for providers: https://www.fda.gov/media/522391/download     Fact sheet for patients: https://www.fda.gov/media/932354/download    Test performed by RT PCR.        No radiology results from the last 24 hrs    I have reviewed the medications:  Scheduled Meds:atorvastatin, 40 mg, Oral, Nightly  azelastine, 1 spray, Each Nare, BID  glipizide, 5 mg, Oral, BID AC  insulin detemir, 25 Units, Subcutaneous,  Nightly  insulin lispro, 0-7 Units, Subcutaneous, TID AC  levothyroxine, 100 mcg, Oral, Q AM  metFORMIN, 1,000 mg, Oral, BID With Meals  senna-docusate sodium, 2 tablet, Oral, BID  sodium chloride, 10 mL, Intravenous, Q12H      Continuous Infusions:   PRN Meds:.senna-docusate sodium **AND** polyethylene glycol **AND** bisacodyl **AND** bisacodyl  •  dextrose  •  dextrose  •  glucagon (human recombinant)  •  HYDROcodone-acetaminophen  •  meclizine  •  ondansetron  •  [COMPLETED] Insert peripheral IV **AND** sodium chloride  •  sodium chloride    Assessment & Plan     Active Hospital Problems    Diagnosis  POA   • **Fall [W19.XXXA]  Yes   • Closed fracture of left elbow, initial encounter [S42.402A]  Yes   • Type 2 diabetes mellitus (HCC) [E11.9]  Yes   • Hypothyroidism [E03.9]  Yes   • Essential hypertension [I10]  Yes   • Left supracondylar humerus fracture [S42.412A]  Yes   • Closed fracture of left patella [S82.002A]  Yes      Resolved Hospital Problems   No resolved problems to display.     Brief Hospital Course to date:  Shira Rogers is a 77 y.o. female with with PMH significant for HTN, HLD, chronic intermittent dizziness, hypothyroidism and poorly-controlled insulin-dependent DMII. She was admitted to [facility] 8/7/22 for L patella and L distal humerus fracture secondary to a mechanical fall at home.     Fall   Acute L patellar fracture  Acute L distal humerus fracture  - Mechanism of fall unknown, likely mechanical  - L knee XR showed acute minimally displaced fracture of patella   - L elbow XR showed acute supracondylar fracture of distal humerus   - Appreciate orthopedic surgery evaluation - recommend non-operative management for now  - NWB to LUE. WBAT to LLE with knee immobilizer   - Continue PRN pain control  - PT/OT following - recommend rehab  - CM following for rehab placement, pending insurance approval at [facility]    False-positive COVID-19 test  - Incidentally tested positive on  admission screening test  - Patient is vaccinated and boosted  - Repeat test negative x 2 - isolation precautions discontinued 8/10    Poorly-controlled insulin-dependent DMII    - Patient notes recent dietary indiscretion and poor medication compliance. She reports she was recently lectured by her PCP for uncontrolled DM and A1c 13.3%  - On Toujeo 34 units QHS, Glimepiride 2mg daily and Metformin 1000mg BID  - Blood glucose 106 this AM - patient symptomatic. Will decrease Levemir to 24mg QHS   - Continue Glipizide 5mg BID and Meftormin 1000mg BID, and SSI     Hyperlipidemia  - Continue statin     Hypothyroidism  - TSH elevated at 13.2, free T4 0.90 - question compliance given   - Synthroid increased to 100mcg this admission. Will need repeat TSH in 4-6 weeks     Hypertension  - BP currently stable, not on any meds    Expected Discharge Location and Transportation: rehab  Expected Discharge Date: 8/12/22 if bed available     DVT prophylaxis:Mechanical DVT prophylaxis orders are present.     AM-PAC 6 Clicks Score (PT): 15 (08/11/22 1006)    CODE STATUS:   Code Status and Medical Interventions:   Ordered at: 08/07/22 1113     Level Of Support Discussed With:    Patient     Code Status (Patient has no pulse and is not breathing):    CPR (Attempt to Resuscitate)     Medical Interventions (Patient has pulse or is breathing):    Full Support     Aleida Justin PA-C  08/11/22

## 2022-08-11 NOTE — CASE MANAGEMENT/SOCIAL WORK
Continued Stay Note  [facility]     Patient Name: Shira Rogers  MRN: [id number]  Today's Date: 8/11/2022    Admit Date: 8/7/2022     Discharge Plan     Row Name 08/11/22 1545       Plan    Plan [facility]    Patient/Family in Agreement with Plan yes    Plan Comments case mgt f/u. Per April at [facility], they can accept Mrs Rogers into an acute level rehab bed tomorrow 8/11. She is in agreement with plan. [facility] wheelchair van will transport at 1130. Case mgt will f/u on 8/11    Final Discharge Disposition Code 62 - inpatient rehab facility               Discharge Codes    No documentation.               Expected Discharge Date and Time     Expected Discharge Date Expected Discharge Time    Aug 12, 2022             Sonja C Kellerman, RN

## 2022-08-12 VITALS
HEART RATE: 90 BPM | WEIGHT: 135 LBS | BODY MASS INDEX: 23.05 KG/M2 | TEMPERATURE: 98.1 F | OXYGEN SATURATION: 95 % | SYSTOLIC BLOOD PRESSURE: 125 MMHG | DIASTOLIC BLOOD PRESSURE: 75 MMHG | RESPIRATION RATE: 16 BRPM | HEIGHT: 64 IN

## 2022-08-12 PROBLEM — E11.65 TYPE 2 DIABETES MELLITUS WITH HYPERGLYCEMIA, WITH LONG-TERM CURRENT USE OF INSULIN: Status: ACTIVE | Noted: 2022-08-07

## 2022-08-12 PROBLEM — S42.402D CLOSED FRACTURE OF DISTAL END OF LEFT HUMERUS WITH ROUTINE HEALING: Status: ACTIVE | Noted: 2022-08-09

## 2022-08-12 PROBLEM — Z79.4 TYPE 2 DIABETES MELLITUS WITH HYPERGLYCEMIA, WITH LONG-TERM CURRENT USE OF INSULIN: Status: ACTIVE | Noted: 2022-08-07
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
## 2022-08-12 PROBLEM — E78.5 HYPERLIPIDEMIA: Status: ACTIVE | Noted: 2022-08-12

## 2022-08-12 LAB — GLUCOSE BLDC GLUCOMTR-MCNC: 110 MG/DL (ref 70–130)

## 2022-08-12 PROCEDURE — 99239 HOSP IP/OBS DSCHRG MGMT >30: CPT | Performed by: PHYSICIAN ASSISTANT

## 2022-08-12 PROCEDURE — 82962 GLUCOSE BLOOD TEST: CPT

## 2022-08-12 RX ORDER — LEVOTHYROXINE SODIUM 0.1 MG/1
100 TABLET ORAL
Start: 2022-08-12

## 2022-08-12 RX ORDER — GLIMEPIRIDE 2 MG/1
2 TABLET ORAL 2 TIMES DAILY
Start: 2022-08-12

## 2022-08-12 RX ORDER — HYDROCODONE BITARTRATE AND ACETAMINOPHEN 5; 325 MG/1; MG/1
1 TABLET ORAL EVERY 6 HOURS PRN
Refills: 0
Start: 2022-08-12

## 2022-08-12 RX ORDER — INSULIN GLARGINE 300 U/ML
20 INJECTION, SOLUTION SUBCUTANEOUS NIGHTLY
Start: 2022-08-12

## 2022-08-12 RX ORDER — MECLIZINE HYDROCHLORIDE 25 MG/1
25 TABLET ORAL 3 TIMES DAILY PRN
Start: 2022-08-12

## 2022-08-12 RX ORDER — AZELASTINE 1 MG/ML
1 SPRAY, METERED NASAL 2 TIMES DAILY
Refills: 12
Start: 2022-08-12

## 2022-08-12 RX ADMIN — HYDROCODONE BITARTRATE AND ACETAMINOPHEN 1 TABLET: 5; 325 TABLET ORAL at 08:53

## 2022-08-12 RX ADMIN — GLIPIZIDE 5 MG: 5 TABLET ORAL at 08:53

## 2022-08-12 RX ADMIN — METFORMIN HYDROCHLORIDE 1000 MG: 1000 TABLET ORAL at 08:53

## 2022-08-12 RX ADMIN — LEVOTHYROXINE SODIUM 100 MCG: 0.1 TABLET ORAL at 05:52

## 2022-08-12 RX ADMIN — AZELASTINE HYDROCHLORIDE 1 SPRAY: 137 SPRAY, METERED NASAL at 08:53

## 2022-08-12 RX ADMIN — HYDROCODONE BITARTRATE AND ACETAMINOPHEN 1 TABLET: 5; 325 TABLET ORAL at 04:15

## 2022-08-12 NOTE — PLAN OF CARE
Problem: Fall Injury Risk  Goal: Absence of Fall and Fall-Related Injury  Outcome: Adequate for Care Transition  Intervention: Identify and Manage Contributors  Recent Flowsheet Documentation  Taken 8/12/2022 0850 by Fabiola Goel RN  Medication Review/Management: medications reviewed  Intervention: Promote Injury-Free Environment  Recent Flowsheet Documentation  Taken 8/12/2022 1030 by Fabiola Goel RN  Safety Promotion/Fall Prevention:   activity supervised   assistive device/personal items within reach   clutter free environment maintained   fall prevention program maintained   gait belt   toileting scheduled   safety round/check completed   room organization consistent   nonskid shoes/slippers when out of bed  Taken 8/12/2022 0850 by Fabiola Goel RN  Safety Promotion/Fall Prevention:   activity supervised   assistive device/personal items within reach   clutter free environment maintained   fall prevention program maintained   gait belt   toileting scheduled   safety round/check completed   room organization consistent   nonskid shoes/slippers when out of bed     Problem: Adult Inpatient Plan of Care  Goal: Plan of Care Review  Outcome: Adequate for Care Transition  Goal: Patient-Specific Goal (Individualized)  Outcome: Adequate for Care Transition  Goal: Absence of Hospital-Acquired Illness or Injury  Outcome: Adequate for Care Transition  Intervention: Identify and Manage Fall Risk  Recent Flowsheet Documentation  Taken 8/12/2022 1030 by Fabiola Goel RN  Safety Promotion/Fall Prevention:   activity supervised   assistive device/personal items within reach   clutter free environment maintained   fall prevention program maintained   gait belt   toileting scheduled   safety round/check completed   room organization consistent   nonskid shoes/slippers when out of bed  Taken 8/12/2022 0850 by Fabiola Goel RN  Safety Promotion/Fall Prevention:   activity supervised   assistive  device/personal items within reach   clutter free environment maintained   fall prevention program maintained   gait belt   toileting scheduled   safety round/check completed   room organization consistent   nonskid shoes/slippers when out of bed  Intervention: Prevent Skin Injury  Recent Flowsheet Documentation  Taken 8/12/2022 1030 by Fabiola Goel RN  Body Position: supine  Skin Protection:   adhesive use limited   transparent dressing maintained   tubing/devices free from skin contact  Taken 8/12/2022 0850 by Fabiola Goel RN  Body Position: sitting up in bed  Skin Protection:   adhesive use limited   tubing/devices free from skin contact   transparent dressing maintained  Intervention: Prevent and Manage VTE (Venous Thromboembolism) Risk  Recent Flowsheet Documentation  Taken 8/12/2022 1030 by Fabiola Goel RN  VTE Prevention/Management:   bilateral   sequential compression devices on  Taken 8/12/2022 0850 by Fabiola Goel RN  VTE Prevention/Management:   bilateral   sequential compression devices on  Intervention: Prevent Infection  Recent Flowsheet Documentation  Taken 8/12/2022 1030 by Fabiola Goel, RN  Infection Prevention: environmental surveillance performed  Taken 8/12/2022 0850 by Fabiola Goel RN  Infection Prevention: environmental surveillance performed  Goal: Optimal Comfort and Wellbeing  Outcome: Adequate for Care Transition  Intervention: Provide Person-Centered Care  Recent Flowsheet Documentation  Taken 8/12/2022 1030 by Fabiola Goel RN  Trust Relationship/Rapport:   care explained   choices provided   questions encouraged   questions answered   reassurance provided   thoughts/feelings acknowledged  Taken 8/12/2022 0850 by Fabiola Goel RN  Trust Relationship/Rapport:   choices provided   care explained   questions encouraged   questions answered   reassurance provided   thoughts/feelings acknowledged  Goal: Readiness for Transition of Care  Outcome:  Adequate for Care Transition     Problem: Skin Injury Risk Increased  Goal: Skin Health and Integrity  Outcome: Adequate for Care Transition  Intervention: Optimize Skin Protection  Recent Flowsheet Documentation  Taken 8/12/2022 1030 by Fabiola Goel RN  Pressure Reduction Techniques: frequent weight shift encouraged  Head of Bed (HOB) Positioning: HOB at 45 degrees  Pressure Reduction Devices: pressure-redistributing mattress utilized  Skin Protection:   adhesive use limited   transparent dressing maintained   tubing/devices free from skin contact  Taken 8/12/2022 0850 by Fabiola Goel RN  Pressure Reduction Techniques: frequent weight shift encouraged  Head of Bed (HOB) Positioning: HOB at 60 degrees  Pressure Reduction Devices: pressure-redistributing mattress utilized  Skin Protection:   adhesive use limited   tubing/devices free from skin contact   transparent dressing maintained   Goal Outcome Evaluation:

## 2022-08-12 NOTE — DISCHARGE SUMMARY
[facility] Hospital Medicine Services  DISCHARGE SUMMARY    Patient Name: Shira Rogers  : 1945  MRN: [id number]    Date of Admission: 2022  7:13 AM  Date of Discharge: 2022  Primary Care Physician: Miguel Mazariegos MD    Consults     Date and Time Order Name Status Description    2022 12:57 PM Inpatient Orthopedic Surgery Consult      2022  9:11 AM Inpatient Orthopedic Surgery Consult Completed         Hospital Course     Presenting Problem:   Fall [W19.XXXA]  Closed fracture of left elbow, initial encounter [S42.402A]    Active Hospital Problems    Diagnosis  POA   • Hyperlipidemia [E78.5]  Yes   • Type 2 diabetes mellitus with hyperglycemia, with long-term current use of insulin (HCC) [E11.65, Z79.4]  Not Applicable   • Hypothyroidism [E03.9]  Yes   • Essential hypertension [I10]  Yes   • Left supracondylar humerus fracture [S42.412A]  Yes   • Closed fracture of left patella [S82.002A]  Yes      Resolved Hospital Problems    Diagnosis Date Resolved POA   • **Fall [W19.XXXA] 2022 Yes      Hospital Course:  Shira Rogers is a 77 y.o. female with with PMH significant for HTN, HLD, chronic intermittent dizziness, hypothyroidism and poorly-controlled insulin-dependent DMII. She was admitted to [facility] 22 for L patella and L distal humerus fracture secondary to a mechanical fall at home.      Fall   Acute L patellar fracture  Acute L distal humerus fracture  - Mechanism of fall unknown, likely mechanical  - L knee XR showed acute minimally displaced fracture of patella   - L elbow XR showed acute supracondylar fracture of distal humerus   - Appreciate orthopedic surgery evaluation - recommend non-operative management for now  - NWB to LUE, keep splint clean and dry at all times. May use platform walker  - WBAT LLE with knee immobilizer on at all times  - Continue PRN pain control  - PT/OT following - to [facility] today  - Follow up with Karen Diaz  LARRY in 1 week with repeat XRs     False-positive COVID-19 test  - Incidentally tested positive on admission screening test  - Patient is vaccinated and boosted  - Repeat test negative x 2 - isolation precautions discontinued 8/10     Poorly-controlled insulin-dependent DMII    - Patient notes recent dietary indiscretion and poor medication compliance. She reports she was recently lectured by her PCP for uncontrolled DM and A1c 13.3%  - On Toujeo 34 units QHS, Glimepiride 2mg daily and Metformin 1000mg BID  - Patient reports improved dietary compliance, having some borderline AM hypoglycemia here on Levemir 24 units QHS, Glipizide 5mg BID and Meftormin 1000mg BID  - Plan to DC on Toujeo 20 units QHS, Glimepiride 2mg BID and Metformin 1000mg BID      Hyperlipidemia  - Continue statin     Hypothyroidism  - TSH elevated at 13.2, free T4 0.90 - question compliance given   - Synthroid increased to 100mcg this admission. Will need repeat TSH in 4-6 weeks     Hypertension  - BP currently stable, not on any meds    Day of Discharge     HPI:   Sitting up in bed. Reports frequent loose stools yesterday. No chest pain or dyspnea. No abdominal pain, nausea or vomiting. Pain from fractures well-controlled with Norco. She is pleased she will be going to [facility] today     Review of Systems  Gen- No fevers, chills  CV- No chest pain, palpitations  Resp- No cough, dyspnea  GI- No N/V or abd pain, (+) loose stools    Vital Signs:   Temp:  [97.9 °F (36.6 °C)-98.5 °F (36.9 °C)] 98.1 °F (36.7 °C)  Heart Rate:  [] 90  Resp:  [14-18] 16  BP: (117-136)/(67-75) 125/75  Flow (L/min):  [2] 2    Physical Exam:  Constitutional: No acute distress, awake, alert and conversant. Sitting upright in bed   HENT: NCAT, mucous membranes moist  Respiratory: Clear to auscultation bilaterally, respiratory effort normal on room air   Cardiovascular: RRR, no murmurs, rubs, or gallops  Gastrointestinal: Positive bowel sounds, soft, nontender,  nondistended  Musculoskeletal: No bilateral ankle edema. LUE splinted. LLE in knee immobilizer. 2+ pedal pulses bilaterally   Psychiatric: Appropriate affect, cooperative  Neurologic: Oriented x 3, moves all extremities spontaneously without focal deficits, speech clear  Skin: No rashes    Pertinent  and/or Most Recent Results     LAB RESULTS:      Lab 08/08/22  0807 08/07/22 0827   WBC 7.78 10.66   HEMOGLOBIN 11.5* 12.1   HEMATOCRIT 33.7* 34.9   PLATELETS 159 174   NEUTROS ABS  --  7.95*   IMMATURE GRANS (ABS)  --  0.04   LYMPHS ABS  --  1.78   MONOS ABS  --  0.63   EOS ABS  --  0.22   MCV 85.3 85.5         Lab 08/08/22  0807 08/07/22 0827   SODIUM 134* 135*   POTASSIUM 4.3 4.1   CHLORIDE 102 103   CO2 24.0 23.0   ANION GAP 8.0 9.0   BUN 14 23   CREATININE 0.76 0.70   EGFR 81.8 90.3   GLUCOSE 286* 294*   CALCIUM 8.5* 9.0   HEMOGLOBIN A1C  --  13.30*   TSH  --  13.200*         Lab 08/07/22 0827   TOTAL PROTEIN 6.7   ALBUMIN 4.00   GLOBULIN 2.7   ALT (SGPT) 15   AST (SGOT) 17   BILIRUBIN 0.3   ALK PHOS 91     Brief Urine Lab Results     None        Microbiology Results (last 10 days)     Procedure Component Value - Date/Time    COVID PRE-OP / PRE-PROCEDURE SCREENING ORDER (NO ISOLATION) - Swab, Nasopharynx [[id number]]  (Normal) Collected: 08/09/22 1157    Lab Status: Final result Specimen: Swab from Nasopharynx Updated: 08/09/22 7514    Narrative:      The following orders were created for panel order COVID PRE-OP / PRE-PROCEDURE SCREENING ORDER (NO ISOLATION) - Swab, Nasopharynx.  Procedure                               Abnormality         Status                     ---------                               -----------         ------                     COVID-19, APTIMA PANTHER...[[id number]]  Normal              Final result                 Please view results for these tests on the individual orders.    COVID-19, APTIMA PANTHER PREETI IN-HOUSE NP/OP SWAB IN UTM/VTM/SALINE TRANSPORT MEDIA 24HR TAT - Swab, Nasopharynx  [[id number]]  (Normal) Collected: 08/09/22 1157    Lab Status: Final result Specimen: Swab from Nasopharynx Updated: 08/09/22 1653     COVID19 Not Detected    Narrative:      Fact sheet for providers: https://www.fda.gov/media/339559/download     Fact sheet for patients: https://www.fda.gov/media/075164/download    Test performed by RT PCR.    COVID PRE-OP / PRE-PROCEDURE SCREENING ORDER (NO ISOLATION) - Swab, Nasopharynx [[id number]]  (Abnormal) Collected: 08/07/22 1146    Lab Status: Final result Specimen: Swab from Nasopharynx Updated: 08/07/22 1313    Narrative:      The following orders were created for panel order COVID PRE-OP / PRE-PROCEDURE SCREENING ORDER (NO ISOLATION) - Swab, Nasopharynx.  Procedure                               Abnormality         Status                     ---------                               -----------         ------                     COVID-19 and FLU A/B PCR...[[id number]]  Abnormal            Final result                 Please view results for these tests on the individual orders.    COVID-19 and FLU A/B PCR - Swab, Nasopharynx [[id number]]  (Abnormal) Collected: 08/07/22 1146    Lab Status: Final result Specimen: Swab from Nasopharynx Updated: 08/07/22 1313     COVID19 Detected     Influenza A PCR Not Detected     Influenza B PCR Not Detected    Narrative:      Fact sheet for providers: https://www.fda.gov/media/103984/download    Fact sheet for patients: https://www.fda.gov/media/038489/download    Test performed by PCR.  Influenza A and Influenza B negative results should be considered presumptive in samples that have a positive SARS-CoV-2 result.    Competitive inhibition studies showed that SARS-CoV-2 virus, when present at concentrations above 3.6E+04 copies/mL, can inhibit the detection and amplification of influenza A and influenza B virus RNA if present at or below 1.8E+02 copies/mL or 4.9E+02 copies/mL, respectively, and may lead to false negative influenza virus  results. If co-infection with influenza A or influenza B virus is suspected in samples with a positive SARS-CoV-2 result, the sample should be re-tested with another FDA cleared, approved, or authorized influenza test, if influenza virus detection would change clinical management.        XR ELBOW 2 VIEW LEFT    Result Date: 8/7/2022  DATE OF EXAM: 8/7/2022 7:39 AM  PROCEDURE: XR ELBOW 2 VW LEFT-  INDICATIONS: fall  COMPARISON: No comparisons available.  TECHNIQUE: Two Radiologic views of the left elbow were obtained.  FINDINGS: There is an acute supracondylar fracture of the distal humerus which on AP view appears to span transversely just proximal from the medial to the lateral epicondyles. Moderate effusion is present.      There is an acute supracondylar fracture of the distal humerus which on AP view appears to span transversely just proximal from the medial to the lateral epicondyles. Moderate effusion is present.  This report was finalized on 8/7/2022 8:06 AM by Isac Avelar.      XR Knee 1 or 2 View Left    Result Date: 8/7/2022  DATE OF EXAM: 8/7/2022 7:39 AM  PROCEDURE: XR KNEE 1 OR 2 VW LEFT-  INDICATIONS: left knee  COMPARISON: No comparisons available.  TECHNIQUE: One to two radiologic views of left knee were obtained.  FINDINGS: There is cortical disruption along the superior pole of the patella concerning for acute minimally displaced fracture. Cortical margins are otherwise intact and alignment appears grossly maintained. 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 There is a moderate to large dense suprapatellar effusion, likely some component of hemarthrosis.      There is cortical disruption along the superior pole of the patella concerning for acute minimally displaced fracture. Cortical margins are otherwise intact and alignment appears grossly maintained. 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 There is a moderate to large dense suprapatellar  effusion, likely some component of hemarthrosis.  This report was finalized on 8/7/2022 8:02 AM by Isac Avelar.      Discharge Details        Discharge Medications      New Medications      Instructions Start Date   HYDROcodone-acetaminophen 5-325 MG per tablet  Commonly known as: NORCO   1 tablet, Oral, Every 6 Hours PRN         Changes to Medications      Instructions Start Date   azelastine 0.1 % nasal spray  Commonly known as: ASTELIN  What changed: how much to take   1 spray, Nasal, 2 Times Daily      glimepiride 2 MG tablet  Commonly known as: AMARYL  What changed: when to take this   2 mg, Oral, 2 Times Daily      levothyroxine 100 MCG tablet  Commonly known as: SYNTHROID, LEVOTHROID  What changed:   · medication strength  · how much to take  · when to take this   100 mcg, Oral, Every Early Morning      meclizine 25 MG tablet  Commonly known as: ANTIVERT  What changed: reasons to take this   25 mg, Oral, 3 Times Daily PRN      metFORMIN 500 MG tablet  Commonly known as: GLUCOPHAGE  What changed: how much to take   1,000 mg, Oral, 2 Times Daily      Toujeo SoloStar 300 UNIT/ML solution pen-injector injection  Generic drug: Insulin Glargine (1 Unit Dial)  What changed:   · how much to take  · additional instructions   20 Units, Subcutaneous, Nightly         Continue These Medications      Instructions Start Date   atorvastatin 40 MG tablet  Commonly known as: LIPITOR   40 mg, Oral, Daily           No Known Allergies    Discharge Disposition:  Skilled Nursing Facility (DC - External)    Diet:  Diet Order   Procedures   • Diet Regular; Consistent Carbohydrate     Activity:  Activity Instructions     Other Activity Instructions      NWB LUE, keep splint clean and dry at all times  May use platform walker  WBAT LLE with knee immobilizer on at all times           CODE STATUS:    Code Status and Medical Interventions:   Ordered at: 08/07/22 1113     Level Of Support Discussed With:    Patient     Code Status  (Patient has no pulse and is not breathing):    CPR (Attempt to Resuscitate)     Medical Interventions (Patient has pulse or is breathing):    Full Support     No future appointments.    Additional Instructions for the Follow-ups that You Need to Schedule     Discharge Follow-up with Specified Provider: Karen Diaz PA-C (ortho with [facility]) in 1 week   As directed      To: Karen Diaz PA-C (ortho with [facility]) in 1 week             Aleida Justin PA-C  08/12/22    Time Spent on Discharge:  I spent 35 minutes on this discharge activity which included: face-to-face encounter with the patient, reviewing the data in the system, coordination of the care with the nursing staff as well as consultants, documentation, and entering orders.

## 2022-08-12 NOTE — CASE MANAGEMENT/SOCIAL WORK
Case Management Discharge Note      Final Note: Ms. Rogers has an inpatient rehab bed today at [facility], if medically ready.  Confirmed bed with April at [facility].  [facility] scheduled to transport the patient at 11:30am today.  Please call report to [facility] at ph [phone], and have a copy of the DC summary and AVS in the DC packet.  Thank you.         Selected Continued Care - Admitted Since 8/7/2022     Destination Coordination complete.    Service Provider Selected Services Address Phone Fax Patient Preferred    [facility]  Inpatient Rehabilitation [address] [id number] [phone] --                  Transportation Services  Ambulance: [facility] Transportation    Final Discharge Disposition Code: 62 - inpatient rehab facility